# Patient Record
Sex: FEMALE | Race: WHITE | NOT HISPANIC OR LATINO | Employment: OTHER | URBAN - METROPOLITAN AREA
[De-identification: names, ages, dates, MRNs, and addresses within clinical notes are randomized per-mention and may not be internally consistent; named-entity substitution may affect disease eponyms.]

---

## 2023-09-21 ENCOUNTER — NURSING HOME VISIT (OUTPATIENT)
Dept: POST ACUTE CARE | Facility: EXTERNAL LOCATION | Age: 78
End: 2023-09-21
Payer: MEDICARE

## 2023-09-21 DIAGNOSIS — F01.511 VASCULAR DEMENTIA WITH AGITATION, UNSPECIFIED DEMENTIA SEVERITY (MULTI): ICD-10-CM

## 2023-09-21 DIAGNOSIS — G47.00 INSOMNIA, UNSPECIFIED TYPE: ICD-10-CM

## 2023-09-21 PROCEDURE — 99305 1ST NF CARE MODERATE MDM 35: CPT | Performed by: FAMILY MEDICINE

## 2023-09-21 NOTE — LETTER
Patient: Ashley Lopez  : 1945    Encounter Date: 2023    Ashley Lopez is a 77 y.o. female with Chief Complaint of SNF ([facility]) H&P    Resident seen 23 -- LIZBETH    CC: SNF ([facility]) H&P    : 1945  SNF H&P done 23 (EPIC)  Allergy: Hydroxyzine, Bactrim  DNR-CCA    S: 78 yo woman with seizure disorder, HTN, HLD, MDD, Hx TIA/CVA with progressive vascular dementia, cared for  by  present at bedside and provides history. Admitted to SNF rehab after hospitalized for fall, CHI, right brow lac.    Non Smoker  Former Psychologist    O: VSS AFEB 104# Awake, alert, pleasantly confused. NAD. Chest cta. heart rrr. Ext no c/c/e. Right brow lac healing. Resolving right periorbital ecchymosis.    A/P:  # Weakness/falls: SNF PT/OT. Goal to return home under  supervision by family. Lovenox until ambulatory.  # Anxiety: buspirone and ativan prn.  # OAB: change oxybutynin to tolterodine  # Insomnia: Trazodone  # Vascular dementia with agitation: off Seroquel per [id number]. Tolerating VPA.  # HLD/Hx CVA: statin    No past surgical history on file.   Social History     Socioeconomic History   • Marital status:      Spouse name: Not on file   • Number of children: Not on file   • Years of education: Not on file   • Highest education level: Not on file   Occupational History   • Not on file   Tobacco Use   • Smoking status: Not on file   • Smokeless tobacco: Not on file   Substance and Sexual Activity   • Alcohol use: Not on file   • Drug use: Not on file   • Sexual activity: Not on file   Other Topics Concern   • Not on file   Social History Narrative   • Not on file     Social Determinants of Health     Financial Resource Strain: Not on file   Food Insecurity: Not on file   Transportation Needs: Not on file   Physical Activity: Not on file   Stress: Not on file   Social Connections: Not on file   Intimate Partner Violence: Not on file   Housing Stability: Not on file     No past medical  history on file.   No family history on file.     Review of Systems   Constitutional:  Negative for chills, fatigue and fever.   HENT:  Negative for rhinorrhea and sore throat.    Eyes:  Negative for pain and redness.   Respiratory:  Negative for cough and shortness of breath.    Cardiovascular:  Negative for chest pain and palpitations.   Gastrointestinal:  Negative for abdominal pain and blood in stool.   Endocrine: Negative for polydipsia and polyuria.   Genitourinary:  Negative for dysuria and hematuria.   Musculoskeletal:  Negative for back pain and neck stiffness.   Skin:  Positive for wound. Negative for rash.   Allergic/Immunologic: Negative for environmental allergies and food allergies.   Neurological:  Positive for weakness. Negative for headaches.   Hematological:  Negative for adenopathy. Does not bruise/bleed easily.   Psychiatric/Behavioral:  Positive for confusion. Negative for hallucinations and suicidal ideas.       There were no vitals taken for this visit.  Physical Exam  Vitals reviewed.   Constitutional:       General: She is not in acute distress.     Appearance: She is not ill-appearing.   HENT:      Head: Normocephalic and atraumatic.      Right Ear: Tympanic membrane normal.      Left Ear: Tympanic membrane normal.      Nose: No congestion or rhinorrhea.      Mouth/Throat:      Pharynx: No oropharyngeal exudate or posterior oropharyngeal erythema.   Eyes:      Extraocular Movements: Extraocular movements intact.      Conjunctiva/sclera: Conjunctivae normal.      Pupils: Pupils are equal, round, and reactive to light.   Cardiovascular:      Rate and Rhythm: Normal rate and regular rhythm.      Heart sounds: No murmur heard.     No friction rub. No gallop.   Pulmonary:      Effort: Pulmonary effort is normal.      Breath sounds: Normal breath sounds. No wheezing, rhonchi or rales.   Abdominal:      General: There is no distension.      Palpations: Abdomen is soft.      Tenderness: There is no  "abdominal tenderness. There is no guarding or rebound.   Musculoskeletal:         General: No swelling or deformity.      Cervical back: Normal range of motion and neck supple.      Right lower leg: No edema.      Left lower leg: No edema.   Skin:     Capillary Refill: Capillary refill takes less than 2 seconds.      Coloration: Skin is not jaundiced.      Findings: Lesion present. No rash.   Neurological:      General: No focal deficit present.      Mental Status: She is alert.      Motor: Weakness present.   Psychiatric:         Mood and Affect: Mood normal.       Lab Results   Component Value Date    WBC 6.1 09/13/2023    HGB 11.3 (L) 09/13/2023    HCT 33.9 (L) 09/13/2023    MCV 93 09/13/2023     09/13/2023     No results found for: \"CHOL\"  No results found for: \"HDL\"  No results found for: \"LDLCALC\"  No results found for: \"TRIG\"  No components found for: \"CHOLHDL\"  Lab Results   Component Value Date    HGBA1C 5.4 09/14/2023       Assessment/Plan  Problem List Items Addressed This Visit       Insomnia     Other Visit Diagnoses       Vascular dementia with agitation, unspecified dementia severity (CMS/HCC)                  Electronically Signed By: Wili Gomez MD   9/26/23  5:51 PM  "

## 2023-09-25 ENCOUNTER — NURSING HOME VISIT (OUTPATIENT)
Dept: POST ACUTE CARE | Facility: EXTERNAL LOCATION | Age: 78
End: 2023-09-25
Payer: MEDICARE

## 2023-09-25 DIAGNOSIS — G47.00 INSOMNIA, UNSPECIFIED TYPE: ICD-10-CM

## 2023-09-25 DIAGNOSIS — F01.511 VASCULAR DEMENTIA WITH AGITATION, UNSPECIFIED DEMENTIA SEVERITY (MULTI): ICD-10-CM

## 2023-09-25 PROCEDURE — 99308 SBSQ NF CARE LOW MDM 20: CPT | Performed by: FAMILY MEDICINE

## 2023-09-25 NOTE — LETTER
Patient: Ashley Lopez  : 1945    Encounter Date: 2023    Resident seen 23 -- MP    CC: SNF ([facility]) Recheck    : 1945  SNF H&P done 23 (EPIC)  Allergy: Hydroxyzine, Bactrim  DNR-CCA    S: 78 yo woman with seizure disorder, HTN, HLD, MDD, Hx TIA/CVA with progressive vascular dementia, falls, CHI, right brow lac.    O: VSS AFEB 104# (23) Awake, alert, pleasantly confused. NAD. Chest cta. heart rrr. Ext no c/c/e. Right brow lac healing. Resolving right periorbital ecchymosis.    A/P:  # Weakness/falls: SNF PT/OT. Goal to return home under 24/7 supervision by family. Lovenox until ambulatory.  # Anxiety: buspirone and ativan prn.  # OAB: change oxybutynin to tolterodine  # Insomnia: Trazodone  # Vascular dementia with agitation: off Seroquel per [id number]. Tolerating VPA.  # HLD/Hx CVA: statin      Electronically Signed By: Wili Gomez MD   23  5:52 PM

## 2023-09-26 PROBLEM — G47.00 INSOMNIA: Status: ACTIVE | Noted: 2023-09-26

## 2023-09-26 PROBLEM — F01.511: Status: ACTIVE | Noted: 2023-09-26

## 2023-09-26 ASSESSMENT — ENCOUNTER SYMPTOMS
WOUND: 1
HEADACHES: 0
EYE PAIN: 0
BLOOD IN STOOL: 0
COUGH: 0
CONFUSION: 1
HALLUCINATIONS: 0
RHINORRHEA: 0
CHILLS: 0
HEMATURIA: 0
BRUISES/BLEEDS EASILY: 0
BACK PAIN: 0
ADENOPATHY: 0
FEVER: 0
ABDOMINAL PAIN: 0
POLYDIPSIA: 0
PALPITATIONS: 0
WEAKNESS: 1
SHORTNESS OF BREATH: 0
NECK STIFFNESS: 0
DYSURIA: 0
FATIGUE: 0
SORE THROAT: 0
EYE REDNESS: 0

## 2023-09-26 NOTE — PROGRESS NOTES
Resident seen 23 -- MP    CC: SNF ([facility]) Recheck    : 1945  SNF H&P done 23 (EPIC)  Allergy: Hydroxyzine, Bactrim  DNR-CCA    S: 78 yo woman with seizure disorder, HTN, HLD, MDD, Hx TIA/CVA with progressive vascular dementia, falls, CHI, right brow lac.    O: VSS AFEB 104# (23) Awake, alert, pleasantly confused. NAD. Chest cta. heart rrr. Ext no c/c/e. Right brow lac healing. Resolving right periorbital ecchymosis.    A/P:  # Weakness/falls: SNF PT/OT. Goal to return home under 24/7 supervision by family. Lovenox until ambulatory.  # Anxiety: buspirone and ativan prn.  # OAB: change oxybutynin to tolterodine  # Insomnia: Trazodone  # Vascular dementia with agitation: off Seroquel per [id number]. Tolerating VPA.  # HLD/Hx CVA: statin

## 2023-09-26 NOTE — PROGRESS NOTES
Ashley Lopez is a 77 y.o. female with Chief Complaint of SNF ([facility]) H&P    Resident seen 23 -- LIZBETH    CC: SNF ([facility]) H&P    : 1945  SNF H&P done 23 (EPIC)  Allergy: Hydroxyzine, Bactrim  DNR-CCA    S: 78 yo woman with seizure disorder, HTN, HLD, MDD, Hx TIA/CVA with progressive vascular dementia, cared for  by  present at bedside and provides history. Admitted to SNF rehab after hospitalized for fall, CHI, right brow lac.    Non Smoker  Former Psychologist    O: VSS AFEB 104# Awake, alert, pleasantly confused. NAD. Chest cta. heart rrr. Ext no c/c/e. Right brow lac healing. Resolving right periorbital ecchymosis.    A/P:  # Weakness/falls: SNF PT/OT. Goal to return home under  supervision by family. Lovenox until ambulatory.  # Anxiety: buspirone and ativan prn.  # OAB: change oxybutynin to tolterodine  # Insomnia: Trazodone  # Vascular dementia with agitation: off Seroquel per [id number]. Tolerating VPA.  # HLD/Hx CVA: statin    No past surgical history on file.   Social History     Socioeconomic History    Marital status:      Spouse name: Not on file    Number of children: Not on file    Years of education: Not on file    Highest education level: Not on file   Occupational History    Not on file   Tobacco Use    Smoking status: Not on file    Smokeless tobacco: Not on file   Substance and Sexual Activity    Alcohol use: Not on file    Drug use: Not on file    Sexual activity: Not on file   Other Topics Concern    Not on file   Social History Narrative    Not on file     Social Determinants of Health     Financial Resource Strain: Not on file   Food Insecurity: Not on file   Transportation Needs: Not on file   Physical Activity: Not on file   Stress: Not on file   Social Connections: Not on file   Intimate Partner Violence: Not on file   Housing Stability: Not on file     No past medical history on file.   No family history on file.     Review of Systems   Constitutional:   Negative for chills, fatigue and fever.   HENT:  Negative for rhinorrhea and sore throat.    Eyes:  Negative for pain and redness.   Respiratory:  Negative for cough and shortness of breath.    Cardiovascular:  Negative for chest pain and palpitations.   Gastrointestinal:  Negative for abdominal pain and blood in stool.   Endocrine: Negative for polydipsia and polyuria.   Genitourinary:  Negative for dysuria and hematuria.   Musculoskeletal:  Negative for back pain and neck stiffness.   Skin:  Positive for wound. Negative for rash.   Allergic/Immunologic: Negative for environmental allergies and food allergies.   Neurological:  Positive for weakness. Negative for headaches.   Hematological:  Negative for adenopathy. Does not bruise/bleed easily.   Psychiatric/Behavioral:  Positive for confusion. Negative for hallucinations and suicidal ideas.       There were no vitals taken for this visit.  Physical Exam  Vitals reviewed.   Constitutional:       General: She is not in acute distress.     Appearance: She is not ill-appearing.   HENT:      Head: Normocephalic and atraumatic.      Right Ear: Tympanic membrane normal.      Left Ear: Tympanic membrane normal.      Nose: No congestion or rhinorrhea.      Mouth/Throat:      Pharynx: No oropharyngeal exudate or posterior oropharyngeal erythema.   Eyes:      Extraocular Movements: Extraocular movements intact.      Conjunctiva/sclera: Conjunctivae normal.      Pupils: Pupils are equal, round, and reactive to light.   Cardiovascular:      Rate and Rhythm: Normal rate and regular rhythm.      Heart sounds: No murmur heard.     No friction rub. No gallop.   Pulmonary:      Effort: Pulmonary effort is normal.      Breath sounds: Normal breath sounds. No wheezing, rhonchi or rales.   Abdominal:      General: There is no distension.      Palpations: Abdomen is soft.      Tenderness: There is no abdominal tenderness. There is no guarding or rebound.   Musculoskeletal:          "General: No swelling or deformity.      Cervical back: Normal range of motion and neck supple.      Right lower leg: No edema.      Left lower leg: No edema.   Skin:     Capillary Refill: Capillary refill takes less than 2 seconds.      Coloration: Skin is not jaundiced.      Findings: Lesion present. No rash.   Neurological:      General: No focal deficit present.      Mental Status: She is alert.      Motor: Weakness present.   Psychiatric:         Mood and Affect: Mood normal.       Lab Results   Component Value Date    WBC 6.1 09/13/2023    HGB 11.3 (L) 09/13/2023    HCT 33.9 (L) 09/13/2023    MCV 93 09/13/2023     09/13/2023     No results found for: \"CHOL\"  No results found for: \"HDL\"  No results found for: \"LDLCALC\"  No results found for: \"TRIG\"  No components found for: \"CHOLHDL\"  Lab Results   Component Value Date    HGBA1C 5.4 09/14/2023       Assessment/Plan   Problem List Items Addressed This Visit       Insomnia     Other Visit Diagnoses       Vascular dementia with agitation, unspecified dementia severity (CMS/HCC)                "

## 2023-09-28 ENCOUNTER — NURSING HOME VISIT (OUTPATIENT)
Dept: POST ACUTE CARE | Facility: EXTERNAL LOCATION | Age: 78
End: 2023-09-28
Payer: MEDICARE

## 2023-09-28 DIAGNOSIS — N32.81 OAB (OVERACTIVE BLADDER): ICD-10-CM

## 2023-09-28 DIAGNOSIS — S06.5XAA SDH (SUBDURAL HEMATOMA) (MULTI): Primary | ICD-10-CM

## 2023-09-28 DIAGNOSIS — F32.A DEPRESSION, UNSPECIFIED DEPRESSION TYPE: ICD-10-CM

## 2023-09-28 DIAGNOSIS — I10 ESSENTIAL HYPERTENSION: ICD-10-CM

## 2023-09-28 DIAGNOSIS — K59.00 CONSTIPATION, UNSPECIFIED CONSTIPATION TYPE: ICD-10-CM

## 2023-09-28 DIAGNOSIS — E78.5 DYSLIPIDEMIA: ICD-10-CM

## 2023-09-28 PROCEDURE — 99310 SBSQ NF CARE HIGH MDM 45: CPT | Performed by: NURSE PRACTITIONER

## 2023-09-29 ENCOUNTER — HOSPITAL ENCOUNTER (INPATIENT)
Dept: DATA CONVERSION | Facility: HOSPITAL | Age: 78
LOS: 4 days | Discharge: NURSING, REHAB OR LONG-TERM CARE | DRG: 064 | End: 2023-10-04
Attending: EMERGENCY MEDICINE | Admitting: STUDENT IN AN ORGANIZED HEALTH CARE EDUCATION/TRAINING PROGRAM
Payer: MEDICARE

## 2023-09-29 DIAGNOSIS — F01.B11 MODERATE VASCULAR DEMENTIA WITH AGITATION (MULTI): ICD-10-CM

## 2023-09-29 DIAGNOSIS — I60.9 SUBARACHNOID BLEED (MULTI): Primary | ICD-10-CM

## 2023-09-29 DIAGNOSIS — N30.00 ACUTE CYSTITIS WITHOUT HEMATURIA: ICD-10-CM

## 2023-09-29 DIAGNOSIS — G47.00 INSOMNIA, UNSPECIFIED TYPE: ICD-10-CM

## 2023-09-29 DIAGNOSIS — I63.9 CEREBRAL INFARCTION, UNSPECIFIED (MULTI): ICD-10-CM

## 2023-09-29 LAB
ABO GROUP (TYPE) IN BLOOD: NORMAL
ACTIVATED PARTIAL THROMBOPLASTIN TIME IN PPP BY COAGULATION ASSAY: 33 SEC (ref 27–38)
ALANINE AMINOTRANSFERASE (SGPT) (U/L) IN SER/PLAS: 27 U/L (ref 7–45)
ALBUMIN (G/DL) IN SER/PLAS: 4.2 G/DL (ref 3.4–5)
ALKALINE PHOSPHATASE (U/L) IN SER/PLAS: 103 U/L (ref 33–136)
ANION GAP IN SER/PLAS: 14 MMOL/L (ref 10–20)
ANTIBODY SCREEN: NORMAL
ASPARTATE AMINOTRANSFERASE (SGOT) (U/L) IN SER/PLAS: 24 U/L (ref 9–39)
ATRIAL RATE: 82 BPM
BASOPHILS (10*3/UL) IN BLOOD BY AUTOMATED COUNT: 0.04 X10E9/L (ref 0–0.1)
BASOPHILS/100 LEUKOCYTES IN BLOOD BY AUTOMATED COUNT: 0.7 % (ref 0–2)
BILIRUBIN TOTAL (MG/DL) IN SER/PLAS: 0.4 MG/DL (ref 0–1.2)
CALCIUM (MG/DL) IN SER/PLAS: 9.3 MG/DL (ref 8.6–10.6)
CARBON DIOXIDE, TOTAL (MMOL/L) IN SER/PLAS: 32 MMOL/L (ref 21–32)
CHLORIDE (MMOL/L) IN SER/PLAS: 100 MMOL/L (ref 98–107)
CREATININE (MG/DL) IN SER/PLAS: 0.51 MG/DL (ref 0.5–1.05)
EOSINOPHILS (10*3/UL) IN BLOOD BY AUTOMATED COUNT: 0.1 X10E9/L (ref 0–0.4)
EOSINOPHILS/100 LEUKOCYTES IN BLOOD BY AUTOMATED COUNT: 1.7 % (ref 0–6)
ERYTHROCYTE DISTRIBUTION WIDTH (RATIO) BY AUTOMATED COUNT: 14.3 % (ref 11.5–14.5)
ERYTHROCYTE MEAN CORPUSCULAR HEMOGLOBIN CONCENTRATION (G/DL) BY AUTOMATED: 33.3 G/DL (ref 32–36)
ERYTHROCYTE MEAN CORPUSCULAR VOLUME (FL) BY AUTOMATED COUNT: 91 FL (ref 80–100)
ERYTHROCYTES (10*6/UL) IN BLOOD BY AUTOMATED COUNT: 4.2 X10E12/L (ref 4–5.2)
GFR FEMALE: >90 ML/MIN/1.73M2
GLUCOSE (MG/DL) IN SER/PLAS: 90 MG/DL (ref 74–99)
HEMATOCRIT (%) IN BLOOD BY AUTOMATED COUNT: 38.4 % (ref 36–46)
HEMOGLOBIN (G/DL) IN BLOOD: 12.8 G/DL (ref 12–16)
IMMATURE GRANULOCYTES/100 LEUKOCYTES IN BLOOD BY AUTOMATED COUNT: 0.5 % (ref 0–0.9)
INR IN PPP BY COAGULATION ASSAY: 1 (ref 0.9–1.1)
LEUKOCYTES (10*3/UL) IN BLOOD BY AUTOMATED COUNT: 5.9 X10E9/L (ref 4.4–11.3)
LYMPHOCYTES (10*3/UL) IN BLOOD BY AUTOMATED COUNT: 1.39 X10E9/L (ref 0.8–3)
LYMPHOCYTES/100 LEUKOCYTES IN BLOOD BY AUTOMATED COUNT: 23.6 % (ref 13–44)
MONOCYTES (10*3/UL) IN BLOOD BY AUTOMATED COUNT: 0.62 X10E9/L (ref 0.05–0.8)
MONOCYTES/100 LEUKOCYTES IN BLOOD BY AUTOMATED COUNT: 10.5 % (ref 2–10)
NEUTROPHILS (10*3/UL) IN BLOOD BY AUTOMATED COUNT: 3.72 X10E9/L (ref 1.6–5.5)
NEUTROPHILS/100 LEUKOCYTES IN BLOOD BY AUTOMATED COUNT: 63 % (ref 40–80)
NRBC (PER 100 WBCS) BY AUTOMATED COUNT: 0 /100 WBC (ref 0–0)
P AXIS: 70 DEGREES
P OFFSET: 181 MS
P ONSET: 157 MS
PATIENT TEMPERATURE: 37 DEGREES C
PLATELETS (10*3/UL) IN BLOOD AUTOMATED COUNT: 569 X10E9/L (ref 150–450)
POCT ANION GAP, VENOUS: 6 MMOL/L (ref 10–25)
POCT BASE EXCESS, VENOUS: 10 MMOL/L (ref -2–3)
POCT CHLORIDE, VENOUS: 99 MMOL/L (ref 98–107)
POCT GLUCOSE, VENOUS: 97 MG/DL (ref 74–99)
POCT HCO3, VENOUS: 36.8 MMOL/L (ref 22–26)
POCT HEMATOCRIT, VENOUS: 41 % (ref 36–46)
POCT HEMOGLOBIN, VENOUS: 13.5 G/DL (ref 12–16)
POCT IONIZED CALCIUM, VENOUS: 1.18 MMOL/L (ref 1.1–1.33)
POCT LACTATE, VENOUS: 1.5 MMOL/L (ref 0.4–2)
POCT OXY HEMOGLOBIN, VENOUS: 20 % (ref 45–75)
POCT PCO2, VENOUS: 58 MMHG (ref 41–51)
POCT PH, VENOUS: 7.41 (ref 7.33–7.43)
POCT PO2, VENOUS: 20 MMHG (ref 35–45)
POCT POTASSIUM, VENOUS: 3.4 MMOL/L (ref 3.5–5.3)
POCT SO2, VENOUS: 20 % (ref 45–75)
POCT SODIUM, VENOUS: 138 MMOL/L (ref 136–145)
POTASSIUM (MMOL/L) IN SER/PLAS: 3.3 MMOL/L (ref 3.5–5.3)
PR INTERVAL: 122 MS
PROTEIN TOTAL: 6.9 G/DL (ref 6.4–8.2)
PROTHROMBIN TIME (PT) IN PPP BY COAGULATION ASSAY: 11 SEC (ref 9.8–12.8)
Q ONSET: 218 MS
QRS COUNT: 14 BEATS
QRS DURATION: 76 MS
QT INTERVAL: 400 MS
QTC CALCULATION(BAZETT): 467 MS
QTC FREDERICIA: 444 MS
R AXIS: 54 DEGREES
RH FACTOR: NORMAL
SARS-COV-2 RESULT: NOT DETECTED
SODIUM (MMOL/L) IN SER/PLAS: 143 MMOL/L (ref 136–145)
T AXIS: 52 DEGREES
T OFFSET: 418 MS
UREA NITROGEN (MG/DL) IN SER/PLAS: 17 MG/DL (ref 6–23)
VALPROIC ACID (UG/ML) IN SER/PLAS: 27 UG/ML (ref 50–100)
VENTRICULAR RATE: 82 BPM

## 2023-09-29 PROCEDURE — 85610 PROTHROMBIN TIME: CPT

## 2023-09-29 PROCEDURE — 85730 THROMBOPLASTIN TIME PARTIAL: CPT

## 2023-09-29 PROCEDURE — 84132 ASSAY OF SERUM POTASSIUM: CPT | Mod: 91

## 2023-09-29 PROCEDURE — 99285 EMERGENCY DEPT VISIT HI MDM: CPT

## 2023-09-29 PROCEDURE — 86900 BLOOD TYPING SEROLOGIC ABO: CPT

## 2023-09-29 PROCEDURE — 96375 TX/PRO/DX INJ NEW DRUG ADDON: CPT

## 2023-09-29 PROCEDURE — 82805 BLOOD GASES W/O2 SATURATION: CPT

## 2023-09-29 PROCEDURE — 96374 THER/PROPH/DIAG INJ IV PUSH: CPT

## 2023-09-29 PROCEDURE — 86850 RBC ANTIBODY SCREEN: CPT

## 2023-09-29 PROCEDURE — 9990 CHARGE CONVERSION: Mod: MA

## 2023-09-29 PROCEDURE — 80164 ASSAY DIPROPYLACETIC ACD TOT: CPT

## 2023-09-29 PROCEDURE — 85018 HEMOGLOBIN: CPT | Mod: 91

## 2023-09-29 PROCEDURE — 93005 ELECTROCARDIOGRAM TRACING: CPT

## 2023-09-29 PROCEDURE — 87635 SARS-COV-2 COVID-19 AMP PRB: CPT

## 2023-09-29 PROCEDURE — 83605 ASSAY OF LACTIC ACID: CPT

## 2023-09-29 PROCEDURE — 84295 ASSAY OF SERUM SODIUM: CPT | Mod: 91

## 2023-09-29 PROCEDURE — 82947 ASSAY GLUCOSE BLOOD QUANT: CPT | Mod: 91

## 2023-09-29 PROCEDURE — 82330 ASSAY OF CALCIUM: CPT

## 2023-09-29 PROCEDURE — 82435 ASSAY OF BLOOD CHLORIDE: CPT | Mod: 91

## 2023-09-29 PROCEDURE — 80053 COMPREHEN METABOLIC PANEL: CPT

## 2023-09-29 PROCEDURE — 95812 EEG 41-60 MINUTES: CPT

## 2023-09-29 PROCEDURE — 70450 CT HEAD/BRAIN W/O DYE: CPT | Mod: MA

## 2023-09-29 PROCEDURE — 86901 BLOOD TYPING SEROLOGIC RH(D): CPT

## 2023-09-29 PROCEDURE — 85025 COMPLETE CBC W/AUTO DIFF WBC: CPT

## 2023-09-29 RX ADMIN — LEVETIRACETAM 500 MG: 5 INJECTION INTRAVENOUS at 16:59

## 2023-09-30 PROBLEM — I61.9 INTRACEREBRAL HEMORRHAGE (MULTI): Status: ACTIVE | Noted: 2023-09-30

## 2023-09-30 PROBLEM — I10 ESSENTIAL HYPERTENSION: Status: ACTIVE | Noted: 2021-10-06

## 2023-09-30 PROBLEM — G40.909 EPILEPSY (MULTI): Status: ACTIVE | Noted: 2023-09-30

## 2023-09-30 PROBLEM — I60.9 SUBARACHNOID BLEED (MULTI): Status: ACTIVE | Noted: 2023-09-30

## 2023-09-30 PROBLEM — F03.C11: Status: ACTIVE | Noted: 2022-04-07

## 2023-09-30 PROBLEM — R45.1 AGITATION: Status: ACTIVE | Noted: 2023-06-02

## 2023-09-30 PROBLEM — R32 URINARY INCONTINENCE: Status: ACTIVE | Noted: 2020-01-14

## 2023-09-30 LAB
APPEARANCE, URINE: ABNORMAL
BILIRUBIN, URINE: NEGATIVE
BLOOD, URINE: ABNORMAL
COLOR, URINE: ABNORMAL
GLUCOSE, URINE: NEGATIVE MG/DL
KETONES, URINE: NEGATIVE MG/DL
LEUKOCYTE ESTERASE, URINE: ABNORMAL
NITRITE, URINE: POSITIVE
PH, URINE: 6
PROTEIN, URINE: ABNORMAL MG/DL
RBC #/AREA URNS AUTO: NORMAL /HPF
SPECIFIC GRAVITY, URINE: 1.02
SQUAMOUS #/AREA URNS AUTO: NORMAL /HPF
UROBILINOGEN, URINE: 2 MG/DL
WBC #/AREA URNS AUTO: NORMAL /HPF

## 2023-09-30 PROCEDURE — 9990 CHARGE CONVERSION: Mod: MA

## 2023-09-30 PROCEDURE — 2500000004 HC RX 250 GENERAL PHARMACY W/ HCPCS (ALT 636 FOR OP/ED)

## 2023-09-30 PROCEDURE — 92610 EVALUATE SWALLOWING FUNCTION: CPT | Mod: GN | Performed by: SPEECH-LANGUAGE PATHOLOGIST

## 2023-09-30 PROCEDURE — 2500000001 HC RX 250 WO HCPCS SELF ADMINISTERED DRUGS (ALT 637 FOR MEDICARE OP)

## 2023-09-30 PROCEDURE — 86850 RBC ANTIBODY SCREEN: CPT

## 2023-09-30 PROCEDURE — 99232 SBSQ HOSP IP/OBS MODERATE 35: CPT | Performed by: STUDENT IN AN ORGANIZED HEALTH CARE EDUCATION/TRAINING PROGRAM

## 2023-09-30 PROCEDURE — 86900 BLOOD TYPING SEROLOGIC ABO: CPT

## 2023-09-30 PROCEDURE — 81001 URINALYSIS AUTO W/SCOPE: CPT | Performed by: STUDENT IN AN ORGANIZED HEALTH CARE EDUCATION/TRAINING PROGRAM

## 2023-09-30 PROCEDURE — 87186 SC STD MICRODIL/AGAR DIL: CPT | Performed by: STUDENT IN AN ORGANIZED HEALTH CARE EDUCATION/TRAINING PROGRAM

## 2023-09-30 PROCEDURE — 2500000004 HC RX 250 GENERAL PHARMACY W/ HCPCS (ALT 636 FOR OP/ED): Performed by: EMERGENCY MEDICINE

## 2023-09-30 PROCEDURE — 70450 CT HEAD/BRAIN W/O DYE: CPT | Mod: MA

## 2023-09-30 PROCEDURE — 1100000001 HC PRIVATE ROOM DAILY

## 2023-09-30 PROCEDURE — 86901 BLOOD TYPING SEROLOGIC RH(D): CPT

## 2023-09-30 RX ORDER — LORAZEPAM 0.5 MG/1
0.5 TABLET ORAL 2 TIMES DAILY PRN
Status: ON HOLD | COMMUNITY
End: 2023-10-02 | Stop reason: SINTOL

## 2023-09-30 RX ORDER — POLYETHYLENE GLYCOL 3350 17 G/17G
17 POWDER, FOR SOLUTION ORAL DAILY PRN
Status: DISCONTINUED | OUTPATIENT
Start: 2023-09-30 | End: 2023-10-04 | Stop reason: HOSPADM

## 2023-09-30 RX ORDER — DIVALPROEX SODIUM 125 MG/1
250 CAPSULE, COATED PELLETS ORAL EVERY 8 HOURS SCHEDULED
Status: DISCONTINUED | OUTPATIENT
Start: 2023-09-30 | End: 2023-10-04 | Stop reason: HOSPADM

## 2023-09-30 RX ORDER — DEXTROMETHORPHAN HYDROBROMIDE, GUAIFENESIN 5; 100 MG/5ML; MG/5ML
975 LIQUID ORAL EVERY 8 HOURS PRN
COMMUNITY

## 2023-09-30 RX ORDER — AMLODIPINE BESYLATE 5 MG/1
5 TABLET ORAL DAILY
Status: DISCONTINUED | OUTPATIENT
Start: 2023-09-30 | End: 2023-10-04 | Stop reason: HOSPADM

## 2023-09-30 RX ORDER — MELATONIN 3 MG
3 CAPSULE ORAL NIGHTLY PRN
COMMUNITY
End: 2023-10-04 | Stop reason: HOSPADM

## 2023-09-30 RX ORDER — BUSPIRONE HYDROCHLORIDE 5 MG/1
5 TABLET ORAL 3 TIMES DAILY
COMMUNITY
End: 2023-11-29 | Stop reason: SDUPTHER

## 2023-09-30 RX ORDER — ATORVASTATIN CALCIUM 20 MG/1
20 TABLET, FILM COATED ORAL DAILY
Status: DISCONTINUED | OUTPATIENT
Start: 2023-09-30 | End: 2023-10-04 | Stop reason: HOSPADM

## 2023-09-30 RX ORDER — ACETAMINOPHEN 160 MG/5ML
650 SOLUTION ORAL EVERY 4 HOURS PRN
Status: DISCONTINUED | OUTPATIENT
Start: 2023-09-30 | End: 2023-10-04 | Stop reason: HOSPADM

## 2023-09-30 RX ORDER — TALC
3 POWDER (GRAM) TOPICAL NIGHTLY
Status: DISCONTINUED | OUTPATIENT
Start: 2023-09-30 | End: 2023-10-04 | Stop reason: HOSPADM

## 2023-09-30 RX ORDER — FLUOXETINE HYDROCHLORIDE 20 MG/1
20 CAPSULE ORAL DAILY
Status: DISCONTINUED | OUTPATIENT
Start: 2023-09-30 | End: 2023-10-04 | Stop reason: HOSPADM

## 2023-09-30 RX ORDER — CEFTRIAXONE 1 G/50ML
INJECTION, SOLUTION INTRAVENOUS
Status: DISPENSED
Start: 2023-09-30 | End: 2023-09-30

## 2023-09-30 RX ORDER — AMOXICILLIN 250 MG
2 CAPSULE ORAL 2 TIMES DAILY
COMMUNITY

## 2023-09-30 RX ORDER — LEVETIRACETAM 5 MG/ML
500 INJECTION INTRAVASCULAR EVERY 12 HOURS
Status: DISCONTINUED | OUTPATIENT
Start: 2023-09-30 | End: 2023-09-30

## 2023-09-30 RX ORDER — AMLODIPINE BESYLATE 5 MG/1
5 TABLET ORAL DAILY
COMMUNITY

## 2023-09-30 RX ORDER — FLUOXETINE HYDROCHLORIDE 20 MG/1
20 CAPSULE ORAL DAILY
COMMUNITY

## 2023-09-30 RX ORDER — ACETAMINOPHEN 650 MG/1
650 SUPPOSITORY RECTAL EVERY 4 HOURS PRN
Status: DISCONTINUED | OUTPATIENT
Start: 2023-09-30 | End: 2023-10-04 | Stop reason: HOSPADM

## 2023-09-30 RX ORDER — AMOXICILLIN 250 MG
2 CAPSULE ORAL 2 TIMES DAILY
Status: CANCELLED | OUTPATIENT
Start: 2023-09-30

## 2023-09-30 RX ORDER — AMOXICILLIN 250 MG
2 CAPSULE ORAL 2 TIMES DAILY
Status: DISCONTINUED | OUTPATIENT
Start: 2023-09-30 | End: 2023-10-04 | Stop reason: HOSPADM

## 2023-09-30 RX ORDER — VALPROIC ACID 250 MG/1
250 CAPSULE, LIQUID FILLED ORAL 3 TIMES DAILY
COMMUNITY

## 2023-09-30 RX ORDER — ENOXAPARIN SODIUM 100 MG/ML
30 INJECTION SUBCUTANEOUS 2 TIMES DAILY
COMMUNITY
End: 2023-09-30 | Stop reason: SINTOL

## 2023-09-30 RX ORDER — QUETIAPINE FUMARATE 25 MG/1
25 TABLET, FILM COATED ORAL NIGHTLY
COMMUNITY
End: 2023-11-29 | Stop reason: SDUPTHER

## 2023-09-30 RX ORDER — POLYETHYLENE GLYCOL 3350 17 G/17G
17 POWDER, FOR SOLUTION ORAL DAILY PRN
COMMUNITY

## 2023-09-30 RX ORDER — VALPROIC ACID 250 MG/1
250 CAPSULE, LIQUID FILLED ORAL 3 TIMES DAILY
Status: DISCONTINUED | OUTPATIENT
Start: 2023-09-30 | End: 2023-09-30

## 2023-09-30 RX ORDER — POLYETHYLENE GLYCOL 3350 17 G/17G
17 POWDER, FOR SOLUTION ORAL DAILY
Status: DISCONTINUED | OUTPATIENT
Start: 2023-09-30 | End: 2023-10-04 | Stop reason: HOSPADM

## 2023-09-30 RX ORDER — QUETIAPINE FUMARATE 25 MG/1
25 TABLET, FILM COATED ORAL NIGHTLY
Status: DISCONTINUED | OUTPATIENT
Start: 2023-09-30 | End: 2023-09-30

## 2023-09-30 RX ORDER — HALOPERIDOL 1 MG/1
1 TABLET ORAL NIGHTLY PRN
COMMUNITY
End: 2023-10-04 | Stop reason: HOSPADM

## 2023-09-30 RX ORDER — DIVALPROEX SODIUM 125 MG/1
250 CAPSULE, COATED PELLETS ORAL EVERY 12 HOURS SCHEDULED
Status: DISCONTINUED | OUTPATIENT
Start: 2023-09-30 | End: 2023-09-30

## 2023-09-30 RX ORDER — TRAZODONE HYDROCHLORIDE 50 MG/1
25 TABLET ORAL NIGHTLY
Status: ON HOLD | COMMUNITY
End: 2023-10-02 | Stop reason: SINTOL

## 2023-09-30 RX ORDER — ACETAMINOPHEN 325 MG/1
975 TABLET ORAL EVERY 8 HOURS PRN
Status: DISCONTINUED | OUTPATIENT
Start: 2023-09-30 | End: 2023-09-30

## 2023-09-30 RX ORDER — TOLTERODINE 4 MG/1
4 CAPSULE, EXTENDED RELEASE ORAL EVERY MORNING
COMMUNITY
End: 2023-09-30 | Stop reason: SINTOL

## 2023-09-30 RX ORDER — ATORVASTATIN CALCIUM 20 MG/1
20 TABLET, FILM COATED ORAL DAILY
COMMUNITY

## 2023-09-30 RX ADMIN — Medication 3 MG: at 22:28

## 2023-09-30 RX ADMIN — AMLODIPINE BESYLATE 5 MG: 5 TABLET ORAL at 11:15

## 2023-09-30 RX ADMIN — ATORVASTATIN CALCIUM 20 MG: 20 TABLET ORAL at 11:15

## 2023-09-30 RX ADMIN — SENNOSIDES AND DOCUSATE SODIUM 2 TABLET: 8.6; 5 TABLET ORAL at 22:29

## 2023-09-30 RX ADMIN — DIVALPROEX SODIUM 250 MG: 125 CAPSULE, COATED PELLETS ORAL at 14:00

## 2023-09-30 RX ADMIN — POLYETHYLENE GLYCOL 3350 17 G: 17 POWDER, FOR SOLUTION ORAL at 12:54

## 2023-09-30 RX ADMIN — SENNOSIDES AND DOCUSATE SODIUM 2 TABLET: 8.6; 5 TABLET ORAL at 11:15

## 2023-09-30 RX ADMIN — FLUOXETINE HYDROCHLORIDE 20 MG: 20 CAPSULE ORAL at 11:15

## 2023-09-30 RX ADMIN — THIAMINE HYDROCHLORIDE 500 MG: 100 INJECTION, SOLUTION INTRAMUSCULAR; INTRAVENOUS at 15:37

## 2023-09-30 ASSESSMENT — ENCOUNTER SYMPTOMS
WEAKNESS: 1
DECREASED CONCENTRATION: 1
NERVOUS/ANXIOUS: 1
CONFUSION: 1

## 2023-09-30 ASSESSMENT — COGNITIVE AND FUNCTIONAL STATUS - GENERAL
CLIMB 3 TO 5 STEPS WITH RAILING: A LOT
MOVING FROM LYING ON BACK TO SITTING ON SIDE OF FLAT BED WITH BEDRAILS: A LOT
EATING MEALS: A LOT
WALKING IN HOSPITAL ROOM: A LOT
TOILETING: A LOT
MOVING TO AND FROM BED TO CHAIR: A LOT
TURNING FROM BACK TO SIDE WHILE IN FLAT BAD: A LOT
DRESSING REGULAR LOWER BODY CLOTHING: A LOT
MOBILITY SCORE: 12
HELP NEEDED FOR BATHING: A LOT
DAILY ACTIVITIY SCORE: 12
PATIENT BASELINE BEDBOUND: NO
DRESSING REGULAR UPPER BODY CLOTHING: A LOT
STANDING UP FROM CHAIR USING ARMS: A LOT
PERSONAL GROOMING: A LOT

## 2023-09-30 ASSESSMENT — PAIN - FUNCTIONAL ASSESSMENT
PAIN_FUNCTIONAL_ASSESSMENT: WONG-BAKER FACES
PAIN_FUNCTIONAL_ASSESSMENT: PAINAD (PAIN ASSESSMENT IN ADVANCED DEMENTIA SCALE)
PAIN_FUNCTIONAL_ASSESSMENT: WONG-BAKER FACES

## 2023-09-30 ASSESSMENT — PAIN SCALES - GENERAL
PAINLEVEL_OUTOF10: 0 - NO PAIN

## 2023-09-30 ASSESSMENT — PAIN SCALES - WONG BAKER
WONGBAKER_NUMERICALRESPONSE: NO HURT
WONGBAKER_NUMERICALRESPONSE: NO HURT

## 2023-09-30 ASSESSMENT — PAIN DESCRIPTION - PROGRESSION: CLINICAL_PROGRESSION: NOT CHANGED

## 2023-09-30 ASSESSMENT — COLUMBIA-SUICIDE SEVERITY RATING SCALE - C-SSRS
1. IN THE PAST MONTH, HAVE YOU WISHED YOU WERE DEAD OR WISHED YOU COULD GO TO SLEEP AND NOT WAKE UP?: NO
6. HAVE YOU EVER DONE ANYTHING, STARTED TO DO ANYTHING, OR PREPARED TO DO ANYTHING TO END YOUR LIFE?: NO
2. HAVE YOU ACTUALLY HAD ANY THOUGHTS OF KILLING YOURSELF?: NO

## 2023-09-30 NOTE — SIGNIFICANT EVENT
Neurology Final Recommendations & Signoff    Seen with attending today on rounds. Intermittently following commands x4. Rest of exam consistent with that by call team. Read & reviewed EEG which reveals theta stupor and amplitudinal asymmetry consistent with encephalopathy and known SDH. No epileptiform activity seen.      ==  Assessment:  Ashley Lopez is a 77 y.o. female with a history of vascular dementia, secondary epilepsy, HTN, DLD, and prior cerebral infarction who is admitted to inpatient internal medicine for AMS in the setting of UTI. Neurology is consulted for AMS and to evaluate for seizure. Exam consistent with toxic-metabolic encephalopathy; EEG shows encephalopathic changes but no seizure. Presentation is most-consistent with Toxic-Metabolic Encephalopathy, for which we recommend treatment of the underlying cause.    Impression: Toxic-Metabolic Encephalopathy (TME)    Recommendations:  - discontinue EEG  - continue home VPA 250mg TID  - start thiamine 500mg IVPB TID x 3 days, followed by thiamine 100mg PO QDay for 2 weeks  - treatment of infection per IM  - if patient's condition fails to respond to the above treatment, can re-engage neurology for further workup      Thank you for this consult. Neurology will sign-off at this time. Please do not hesitate to reach out to our team by page or secure chat with any questions you may have. Patient was seen, examined, and discussed with the attending physician, who agrees w/ the assessment and plan.    Filemon Howe MD PGY-3  [facility] Inpatient Neurology Team  General Neurology Pager [phone]

## 2023-09-30 NOTE — HOSPITAL COURSE
Ashley Lopez is a 77-year-old female with a PMHx  of vascular dementia, epilepsy on valproic acid, HTN, HLD, prior CVAs (x4), presenting to [facility] after reportedly having worsening mental status (according to SNF), new SAH, and worsening of previous SDH on f/u OP CT. Of note, she was recently admitted due to SDH after a fall (9/11) and discharged to [facility] at [place]. In the ED, patient was A&Ox0 and not following command. Repeat CT showed stable SAH with no further intervention required per Neurosurgery. EEG obtained and showed findings consistent w/ encephalopathy and known SDH but w/o epileptiform activity. Valproate was continued for seizure prophylaxis, and thiamine added (100mg daily until 10/14). U/A was obtained and revealed trace LE and positive nitrites but no pyuria (15 WBCs). External urethral cath placed. Concern for UTI with culture revealing E. Coli and E. Faecalis. Patient initiated on Nitrofurantoin 100mg PO BID on 10/2 evening for a 5 day course (until 10/7). Ativan, Trazodone and Buspirone from nursing home were discontinued on arrival as polypharmacy may have contributed to AMS. On 10/2, there were concerns for continuing evening agitation due to sundowning and pt attempting to leave the bed, so seroquel 25mg PO was added PRN on evenings. Pt is back at her baseline and w/o complaints. Patient is ready for discharge to [place] for rehabilitation. Medication changes per AVS. We also recommend following-up with Neurosurgery for SAH and with PCP for UTI and general wellness.

## 2023-09-30 NOTE — PROGRESS NOTES
Speech-Language Pathology    Inpatient Speech-Language Pathology Clinical Swallow Evaluation    Patient Name: Ashley Lopez  MRN: [id number]  Today's Date: 9/30/2023   Time Calculation  Start Time: 1414  Stop Time: 1444  Time Calculation (min): 30 min         Current Problem:   Patient Active Problem List   Diagnosis    Insomnia    Vascular dementia with agitation (CMS/HCC)    Agitation    Epilepsy (CMS/HCC)    Essential hypertension    Intracerebral hemorrhage (CMS/HCC)    Urinary incontinence    Severe dementia with agitation (CMS/HCC)    Subarachnoid bleed (CMS/HCC)         Recommendations:  Solid Diet Recommendations : Regular (IDDSI Level 7)  Liquid Diet Recommendations: Thin (IDDSI Level 0)  Compensatory Swallowing Strategies: Upright 90 degrees as possible for all oral intake, Remain upright for 20-30 minutes after meals  Medication Administration Recommendations: With Liquid      Assessment: Clinical swallow evaluation completed. Pt. A&O to self only with functional oral motor musculature. Pt. tolerated ice chips and sips of water without difficulty. Consumed 3 oz of water in continuous sequential swallows without overt s/s of aspiration.  Adequate manipulation of puree and mastication of solids with full oral clearance. No evidence of dysphagia at this time. If there is a change in medical condition or increase difficulty swallowing please reconsult SLP. Discussed with RN and MD results and recommendations.         Plan:  SLP Plan: No skilled SLP  SLP Discharge Recommendations: Home with no further SLP      Subjective   Current Problem:  Ashley Lopez is a 77 y.o. female with a history of vascular dementia, secondary epilepsy, HTN, DLD, and prior cerebral infarction who is admitted to inpatient internal medicine for AMS in the setting of UTI. Neurology is consulted for AMS and to evaluate for seizure. Exam consistent with toxic-metabolic encephalopathy; EEG shows encephalopathic changes but no seizure.  Presentation is most-consistent with Toxic-Metabolic Encephalopathy, for which we recommend treatment of the underlying cause.       General Visit Information:  Reason for Referral: Concern for dyspahgia           Objective   Patient tolerated session well.          Pain:  Pain Assessment: Moore-Baker FACES  Pain Score: 0 - No pain       Oral/Motor Assessment:   Oral musculature WFL, adequate dentition in good shape      Consistencies Trialed: Thin liquids, puree and solids       Inpatient:  Education Documentation  No documentation found.  Education Comments  No comments found.    Review results with pt, however due to demential question comprehension at this time.

## 2023-09-30 NOTE — ED PROVIDER NOTES
I received Ashley Lopez in signout from Dr. Compa Mota.  Please see the previous note for all HPI, PE and MDM up to the time of signout at 0700 on 9/30/2023.    In brief Ashley Lopez is an 77 y.o. female presenting as a transfer from [facility] after she was found to have a new SAH.  Chief Complaint   Patient presents with    Other     BRAIN BLEED    Fall     Transfer from [facility] d/t fall 9/11/23. Pt was dx w/ SDH at that time but has been exhibiting severe AMS the past week. OSH repeat head CT shows SAH in L fromtal lobe abd SDH R cerebral convexity that has increased in size. Pt states it is 1976 and she is 42 y/o     At the time of signout the patient was admitted to medicine.    At approximately 0900 the patient desaturated to 88%. Nasal cannula was placed and flow was set to 2L with improvement in SpO2 to 96%.      Pt Disposition: Admitted           Halley Callejas MD  Resident  09/30/23 0911       Halley Callejas MD  Resident  09/30/23 1939
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
## 2023-10-01 LAB
ALBUMIN SERPL BCP-MCNC: 3.9 G/DL (ref 3.4–5)
ALP SERPL-CCNC: 100 U/L (ref 33–136)
ALT SERPL W P-5'-P-CCNC: 75 U/L (ref 7–45)
ANION GAP SERPL CALC-SCNC: 14 MMOL/L (ref 10–20)
AST SERPL W P-5'-P-CCNC: 47 U/L (ref 9–39)
BILIRUB SERPL-MCNC: 0.4 MG/DL (ref 0–1.2)
BUN SERPL-MCNC: 10 MG/DL (ref 6–23)
CALCIUM SERPL-MCNC: 9.4 MG/DL (ref 8.6–10.6)
CHLORIDE SERPL-SCNC: 97 MMOL/L (ref 98–107)
CO2 SERPL-SCNC: 31 MMOL/L (ref 21–32)
CREAT SERPL-MCNC: 0.49 MG/DL (ref 0.5–1.05)
ERYTHROCYTE [DISTWIDTH] IN BLOOD BY AUTOMATED COUNT: 14.7 % (ref 11.5–14.5)
GFR SERPL CREATININE-BSD FRML MDRD: >90 ML/MIN/1.73M*2
GLUCOSE SERPL-MCNC: 97 MG/DL (ref 74–99)
HCT VFR BLD AUTO: 37.8 % (ref 36–46)
HGB BLD-MCNC: 12.4 G/DL (ref 12–16)
HOLD SPECIMEN: NORMAL
MAGNESIUM SERPL-MCNC: 2.18 MG/DL (ref 1.6–2.4)
MCH RBC QN AUTO: 30.2 PG (ref 26–34)
MCHC RBC AUTO-ENTMCNC: 32.8 G/DL (ref 32–36)
MCV RBC AUTO: 92 FL (ref 80–100)
NRBC BLD-RTO: 0 /100 WBCS (ref 0–0)
PLATELET # BLD AUTO: 496 X10*3/UL (ref 150–450)
PMV BLD AUTO: 10.1 FL (ref 7.5–11.5)
POTASSIUM SERPL-SCNC: 3.9 MMOL/L (ref 3.5–5.3)
PROT SERPL-MCNC: 6.2 G/DL (ref 6.4–8.2)
RBC # BLD AUTO: 4.11 X10*6/UL (ref 4–5.2)
SODIUM SERPL-SCNC: 138 MMOL/L (ref 136–145)
WBC # BLD AUTO: 6.5 X10*3/UL (ref 4.4–11.3)

## 2023-10-01 PROCEDURE — 99232 SBSQ HOSP IP/OBS MODERATE 35: CPT | Performed by: STUDENT IN AN ORGANIZED HEALTH CARE EDUCATION/TRAINING PROGRAM

## 2023-10-01 PROCEDURE — 1100000001 HC PRIVATE ROOM DAILY

## 2023-10-01 PROCEDURE — 80053 COMPREHEN METABOLIC PANEL: CPT

## 2023-10-01 PROCEDURE — 83735 ASSAY OF MAGNESIUM: CPT

## 2023-10-01 PROCEDURE — 97162 PT EVAL MOD COMPLEX 30 MIN: CPT | Mod: GP

## 2023-10-01 PROCEDURE — 2500000001 HC RX 250 WO HCPCS SELF ADMINISTERED DRUGS (ALT 637 FOR MEDICARE OP)

## 2023-10-01 PROCEDURE — 36415 COLL VENOUS BLD VENIPUNCTURE: CPT

## 2023-10-01 PROCEDURE — 85027 COMPLETE CBC AUTOMATED: CPT

## 2023-10-01 PROCEDURE — 2500000004 HC RX 250 GENERAL PHARMACY W/ HCPCS (ALT 636 FOR OP/ED)

## 2023-10-01 RX ADMIN — SENNOSIDES AND DOCUSATE SODIUM 2 TABLET: 8.6; 5 TABLET ORAL at 08:42

## 2023-10-01 RX ADMIN — THIAMINE HYDROCHLORIDE 500 MG: 100 INJECTION, SOLUTION INTRAMUSCULAR; INTRAVENOUS at 08:55

## 2023-10-01 RX ADMIN — POLYETHYLENE GLYCOL 3350 17 G: 17 POWDER, FOR SOLUTION ORAL at 08:42

## 2023-10-01 RX ADMIN — DIVALPROEX SODIUM 250 MG: 125 CAPSULE, COATED PELLETS ORAL at 03:55

## 2023-10-01 RX ADMIN — Medication 3 MG: at 22:35

## 2023-10-01 RX ADMIN — AMLODIPINE BESYLATE 5 MG: 5 TABLET ORAL at 08:43

## 2023-10-01 RX ADMIN — ATORVASTATIN CALCIUM 20 MG: 20 TABLET ORAL at 08:42

## 2023-10-01 RX ADMIN — FLUOXETINE HYDROCHLORIDE 20 MG: 20 CAPSULE ORAL at 08:42

## 2023-10-01 RX ADMIN — DIVALPROEX SODIUM 250 MG: 125 CAPSULE, COATED PELLETS ORAL at 22:35

## 2023-10-01 RX ADMIN — SENNOSIDES AND DOCUSATE SODIUM 2 TABLET: 8.6; 5 TABLET ORAL at 22:35

## 2023-10-01 RX ADMIN — DIVALPROEX SODIUM 250 MG: 125 CAPSULE, COATED PELLETS ORAL at 13:33

## 2023-10-01 ASSESSMENT — COGNITIVE AND FUNCTIONAL STATUS - GENERAL
WALKING IN HOSPITAL ROOM: TOTAL
DAILY ACTIVITIY SCORE: 12
STANDING UP FROM CHAIR USING ARMS: A LOT
MOBILITY SCORE: 9
MOVING TO AND FROM BED TO CHAIR: TOTAL
DRESSING REGULAR UPPER BODY CLOTHING: A LOT
CLIMB 3 TO 5 STEPS WITH RAILING: TOTAL
TURNING FROM BACK TO SIDE WHILE IN FLAT BAD: A LOT
MOVING TO AND FROM BED TO CHAIR: TOTAL
EATING MEALS: A LOT
STANDING UP FROM CHAIR USING ARMS: A LOT
MOVING FROM LYING ON BACK TO SITTING ON SIDE OF FLAT BED WITH BEDRAILS: A LOT
WALKING IN HOSPITAL ROOM: TOTAL
MOVING FROM LYING ON BACK TO SITTING ON SIDE OF FLAT BED WITH BEDRAILS: A LOT
HELP NEEDED FOR BATHING: A LOT
TOILETING: A LOT
CLIMB 3 TO 5 STEPS WITH RAILING: TOTAL
DRESSING REGULAR LOWER BODY CLOTHING: A LOT
TURNING FROM BACK TO SIDE WHILE IN FLAT BAD: A LOT
MOBILITY SCORE: 9
PERSONAL GROOMING: A LOT

## 2023-10-01 ASSESSMENT — PAIN SCALES - GENERAL: PAINLEVEL_OUTOF10: 0 - NO PAIN

## 2023-10-01 ASSESSMENT — PAIN - FUNCTIONAL ASSESSMENT: PAIN_FUNCTIONAL_ASSESSMENT: 0-10

## 2023-10-01 NOTE — CARE PLAN
Problem: Balance  Goal: STG - Maintains static sitting balance without upper extremity support  Description: At EOB x10 minutes with supervision  Outcome: Progressing     Problem: Transfers  Goal: STG - Transfer from bed to chair  Description: Via stand pivot transfer with modA x1  Outcome: Progressing  Goal: STG - Patient to transfer to and from sit to supine  Description: With Gisel x1  Outcome: Progressing  Goal: STG - Patient will transfer sit to and from stand  Description: With LRAD and Gisel x1  Outcome: Progressing

## 2023-10-01 NOTE — PROGRESS NOTES
"Ashley Lopez is a 77 y.o. female on day 1 of admission presenting with Subarachnoid bleed (CMS/HCC).    Subjective   N/A       Objective     Physical Exam  EONS  OU4R  BUE localizing   BLE w/d    Last Recorded Vitals  Blood pressure (!) 135/98, pulse 80, temperature 36.7 °C (98.1 °F), resp. rate 17, height 1.676 m (5' 6\"), weight 63.5 kg (140 lb), SpO2 96 %.  Intake/Output last 3 Shifts:  No intake/output data recorded.    Relevant Results                             Assessment/Plan   Principal Problem:    Subarachnoid bleed (CMS/HCC)    77 year old of CVA on ASA, vascular dementia, epilepsy, HTN, HLD p/w multiple falls, last on 9/11, CTH with R acute SDH, rCTH with increased SDH, 9/12 CTH stable, 9/29 presenting with worsening mental status, CTH with stable resolving right subacute on chronic SDH, repeat CTH stable, spot EEG in ED negative    Recs    No further neurosurgical care is needed, patient with stable chronic SDH  We will sign off, page [phone] with any questions       I spent 30 minutes in the professional and overall care of this patient.      Derrek Nolasco MD      "

## 2023-10-01 NOTE — PROGRESS NOTES
"Ashley Lopez is a 77 y.o. female on day 1 of admission presenting with Subarachnoid bleed (CMS/HCC). PMHx is relevant for vascular dementia, epilepsy on valproic acid, HTN, prior CVAs who presents to [facility] on 9/28 for AMS with likely underlying UTI. Recent history is relevant for recent fall and admission on 9/11 for subdural hematoma, treated conservatively, and baseline confusion and disorientation.    Subjective   Per night team, BP of 220/100s was reported overnight but per resident's check, BP was repeated and found to be 150/70s. Patient was asymptomatic. Delays with pharmacy overnight, so valproate was only givern at 3am. This morning, pt is seen at bedside, reports having slept well. Is still A&Ox0, although able to answer yes vs no questions when asked directly (I.e. is you name Kelli? Ashley? Are you at home? Hospital? Etc). No changes from yesterday.       Objective     Physical Exam  Constitutional:       General: She is awake.      Comments: Awake and disoriented. Knows she is not currently \"at home\" but cannot state her name, location or time.   Cardiovascular:      Rate and Rhythm: Normal rate and regular rhythm.      Heart sounds: Normal heart sounds.   Pulmonary:      Effort: Pulmonary effort is normal.      Breath sounds: Normal breath sounds.   Abdominal:      General: Bowel sounds are normal. There is no distension.      Palpations: Abdomen is soft. There is no mass.      Tenderness: There is no abdominal tenderness.   Musculoskeletal:      Right lower leg: No edema.      Left lower leg: No edema.   Skin:     General: Skin is warm and dry.   Neurological:      Mental Status: She is disoriented.      Comments: Right sided weakness. Can wiggle her toes bilaterally but L>R and cannot squeeze her R hand. Difficulty following commands.   Psychiatric:         Cognition and Memory: Cognition is impaired. Memory is impaired.      Comments: Patient is alert and responds to only a few questions, but " "otherwise not oriented to time, place, name or situation.     Last Recorded Vitals  Blood pressure (!) 135/98, pulse 80, temperature 36.7 °C (98.1 °F), resp. rate 17, height 1.676 m (5' 6\"), weight 63.5 kg (140 lb), SpO2 96 %.  Intake/Output last 3 Shifts:  No intake/output data recorded.    Relevant Results                Scheduled medications  amLODIPine, 5 mg, oral, Daily  atorvastatin, 20 mg, oral, Daily  divalproex sprinkle, 250 mg, oral, q8h JALIL  FLUoxetine, 20 mg, oral, Daily  melatonin, 3 mg, oral, Nightly  polyethylene glycol, 17 g, oral, Daily  sennosides-docusate sodium, 2 tablet, oral, BID  thiamine, 500 mg, intravenous, Daily      Continuous medications     PRN medications  PRN medications: acetaminophen **OR** acetaminophen, polyethylene glycol    Results for orders placed or performed during the hospital encounter of 09/29/23 (from the past 24 hour(s))   CBC   Result Value Ref Range    WBC 6.5 4.4 - 11.3 x10*3/uL    nRBC 0.0 0.0 - 0.0 /100 WBCs    RBC 4.11 4.00 - 5.20 x10*6/uL    Hemoglobin 12.4 12.0 - 16.0 g/dL    Hematocrit 37.8 36.0 - 46.0 %    MCV 92 80 - 100 fL    MCH 30.2 26.0 - 34.0 pg    MCHC 32.8 32.0 - 36.0 g/dL    RDW 14.7 (H) 11.5 - 14.5 %    Platelets 496 (H) 150 - 450 x10*3/uL    MPV 10.1 7.5 - 11.5 fL   Comprehensive metabolic panel   Result Value Ref Range    Glucose 97 74 - 99 mg/dL    Sodium 138 136 - 145 mmol/L    Potassium 3.9 3.5 - 5.3 mmol/L    Chloride 97 (L) 98 - 107 mmol/L    Bicarbonate 31 21 - 32 mmol/L    Anion Gap 14 10 - 20 mmol/L    Urea Nitrogen 10 6 - 23 mg/dL    Creatinine 0.49 (L) 0.50 - 1.05 mg/dL    eGFR >90 >60 mL/min/1.73m*2    Calcium 9.4 8.6 - 10.6 mg/dL    Albumin 3.9 3.4 - 5.0 g/dL    Alkaline Phosphatase 100 33 - 136 U/L    Total Protein 6.2 (L) 6.4 - 8.2 g/dL    AST 47 (H) 9 - 39 U/L    Bilirubin, Total 0.4 0.0 - 1.2 mg/dL    ALT 75 (H) 7 - 45 U/L   Magnesium   Result Value Ref Range    Magnesium 2.18 1.60 - 2.40 mg/dL                Assessment/Plan "   Principal Problem:    Subarachnoid bleed (CMS/HCC)    Ashley Lopez is a 77-year-old female with a PMHx  of vascular dementia, epilepsy, HTN, HLD, prior CVAs (x4), presenting to [facility] due to abnormal findings on follow-up CT scan, concerning for new SAH on chronic SDH from a previous fall on 9/11/2023 with U/A findings concerning for possible UTI.     The etiology of AMS is likely multifactorial, but mainly SDH on top of her baseline dementia, although patient has a history of recurrent UTIs. Per recent history, there has been no concern for seizure.    Updates 10/1  -No neurological changes since yesterday  -Repeat U/A (now U/Ax2) unchanged: no pyuria, positive nitrite, trace LE, 1+ blood w/o RBCs  -UCx revealing >100,000 E. Bacilli, possible contamination?  -Waiting on UCx sensitivities prior to starting Abx  -Pt remains afebrile and w/o leukocytosis  -Hgb stable, 12.4  -Neurosurgery approved restarting DVT ppx  -No seizure activity clinically nor on EEG from 9/29 per Neuro       #AMS  - CT head obtained in the ED showing chronic stable SDH and SAH along L central sulcus and R frontal lobe  - Per Neuro, EEG revealed theta stupor and amplitudinal asymmetry consistent w/ encephalopathy and known SDH. No epileptiform activity seen. EEG dc'ed.  - Swallow study was done and revealed to be normal; patient now on a regular diet  [] Continue home VPA 250mg TID  [] Continue thiamine 500mg IVPB TID x3 days, followed by thiamine 100mg PO qd for 2 weeks  [] DVT ppx good to restart per Neurosurgery       #UTI  - U/A from the ED showed positive nitrites and trace LE although w/o pyuria  - Repeat U/A on 9/30 showed no changes  - Urine cx revealing >100,000 units of Enteric bacilli; maybe from cross-contamination? Will treat  - Patient afebrile and w/ no leukocytosis: WBC 6.5 (10/1)   - One dose of Ceftriaxone given in the ED, now discontinued  [] Will wait on urine cx sensitivities to come back prior to initiating  antibiotic regiment     F: PRN  E: PRN  N: regular diet  A: PIV     GI ppx: not indicated  DVT ppx: SubQ heparin (allowed per Neuro on 10/1)           Sera Duran

## 2023-10-01 NOTE — PROGRESS NOTES
"Ashley Lopez is a 77 y.o. female on day 0 of admission presenting with Subarachnoid bleed (CMS/HCC).    Subjective   Unable to contribute       Objective     Physical Exam  EONS  OU4R  BUE localizing   BLE w/d    Last Recorded Vitals  Blood pressure 163/87, pulse 92, temperature 36.3 °C (97.3 °F), resp. rate 17, height 1.676 m (5' 6\"), weight 63.5 kg (140 lb), SpO2 98 %.  Intake/Output last 3 Shifts:  No intake/output data recorded.    Relevant Results                             Assessment/Plan   Principal Problem:    Subarachnoid bleed (CMS/HCC)    77 year old of CVA on ASA, vascular dementia, epilepsy, HTN, HLD p/w multiple falls, last on 9/11, CTH with R acute SDH, rCTH with increased SDH, 9/12 CTH stable, 9/29 presenting with worsening mental status, CTH with stable resolving right subacute on chronic SDH, repeat CTH stable, spot EEG in ED negative    Recs    Recommend medicine admission for AMS work-up  Neurology recs re possible seizure etiology of waxing/waning mental status  AED per neurology  Ok to restart DVT ppx   Page [phone] with any questions       I spent 30 minutes in the professional and overall care of this patient.      Yoan Castrejon MD      "

## 2023-10-02 LAB
ALBUMIN SERPL BCP-MCNC: 3.7 G/DL (ref 3.4–5)
ANION GAP SERPL CALC-SCNC: 12 MMOL/L (ref 10–20)
BUN SERPL-MCNC: 8 MG/DL (ref 6–23)
CALCIUM SERPL-MCNC: 9.2 MG/DL (ref 8.6–10.6)
CHLORIDE SERPL-SCNC: 99 MMOL/L (ref 98–107)
CO2 SERPL-SCNC: 32 MMOL/L (ref 21–32)
CREAT SERPL-MCNC: 0.42 MG/DL (ref 0.5–1.05)
ERYTHROCYTE [DISTWIDTH] IN BLOOD BY AUTOMATED COUNT: 14.6 % (ref 11.5–14.5)
GFR SERPL CREATININE-BSD FRML MDRD: >90 ML/MIN/1.73M*2
GLUCOSE SERPL-MCNC: 96 MG/DL (ref 74–99)
HCT VFR BLD AUTO: 38.7 % (ref 36–46)
HGB BLD-MCNC: 12.6 G/DL (ref 12–16)
MAGNESIUM SERPL-MCNC: 2.24 MG/DL (ref 1.6–2.4)
MCH RBC QN AUTO: 30.7 PG (ref 26–34)
MCHC RBC AUTO-ENTMCNC: 32.6 G/DL (ref 32–36)
MCV RBC AUTO: 94 FL (ref 80–100)
NRBC BLD-RTO: 0 /100 WBCS (ref 0–0)
PHOSPHATE SERPL-MCNC: 3.7 MG/DL (ref 2.5–4.9)
PLATELET # BLD AUTO: 451 X10*3/UL (ref 150–450)
PMV BLD AUTO: 10.4 FL (ref 7.5–11.5)
POTASSIUM SERPL-SCNC: 3.9 MMOL/L (ref 3.5–5.3)
RBC # BLD AUTO: 4.1 X10*6/UL (ref 4–5.2)
SODIUM SERPL-SCNC: 139 MMOL/L (ref 136–145)
WBC # BLD AUTO: 5.8 X10*3/UL (ref 4.4–11.3)

## 2023-10-02 PROCEDURE — 1100000001 HC PRIVATE ROOM DAILY

## 2023-10-02 PROCEDURE — 83735 ASSAY OF MAGNESIUM: CPT | Performed by: STUDENT IN AN ORGANIZED HEALTH CARE EDUCATION/TRAINING PROGRAM

## 2023-10-02 PROCEDURE — 93005 ELECTROCARDIOGRAM TRACING: CPT

## 2023-10-02 PROCEDURE — 99232 SBSQ HOSP IP/OBS MODERATE 35: CPT | Performed by: INTERNAL MEDICINE

## 2023-10-02 PROCEDURE — 2500000004 HC RX 250 GENERAL PHARMACY W/ HCPCS (ALT 636 FOR OP/ED)

## 2023-10-02 PROCEDURE — 96375 TX/PRO/DX INJ NEW DRUG ADDON: CPT

## 2023-10-02 PROCEDURE — 36415 COLL VENOUS BLD VENIPUNCTURE: CPT | Performed by: STUDENT IN AN ORGANIZED HEALTH CARE EDUCATION/TRAINING PROGRAM

## 2023-10-02 PROCEDURE — 9990 CHARGE CONVERSION

## 2023-10-02 PROCEDURE — 2500000004 HC RX 250 GENERAL PHARMACY W/ HCPCS (ALT 636 FOR OP/ED): Performed by: STUDENT IN AN ORGANIZED HEALTH CARE EDUCATION/TRAINING PROGRAM

## 2023-10-02 PROCEDURE — 2500000001 HC RX 250 WO HCPCS SELF ADMINISTERED DRUGS (ALT 637 FOR MEDICARE OP)

## 2023-10-02 PROCEDURE — 96374 THER/PROPH/DIAG INJ IV PUSH: CPT

## 2023-10-02 PROCEDURE — 85027 COMPLETE CBC AUTOMATED: CPT | Performed by: STUDENT IN AN ORGANIZED HEALTH CARE EDUCATION/TRAINING PROGRAM

## 2023-10-02 PROCEDURE — 80069 RENAL FUNCTION PANEL: CPT | Performed by: STUDENT IN AN ORGANIZED HEALTH CARE EDUCATION/TRAINING PROGRAM

## 2023-10-02 RX ORDER — NITROFURANTOIN 25; 75 MG/1; MG/1
100 CAPSULE ORAL EVERY 12 HOURS SCHEDULED
Status: DISCONTINUED | OUTPATIENT
Start: 2023-10-02 | End: 2023-10-04 | Stop reason: HOSPADM

## 2023-10-02 RX ORDER — QUETIAPINE FUMARATE 25 MG/1
25 TABLET, FILM COATED ORAL NIGHTLY PRN
Status: DISCONTINUED | OUTPATIENT
Start: 2023-10-02 | End: 2023-10-04 | Stop reason: HOSPADM

## 2023-10-02 RX ORDER — MELATONIN 5 MG
1 CAPSULE ORAL NIGHTLY
Qty: 30 CAPSULE | Refills: 0
Start: 2023-10-02 | End: 2023-11-01

## 2023-10-02 RX ADMIN — AMLODIPINE BESYLATE 5 MG: 5 TABLET ORAL at 10:31

## 2023-10-02 RX ADMIN — ATORVASTATIN CALCIUM 20 MG: 20 TABLET ORAL at 10:29

## 2023-10-02 RX ADMIN — QUETIAPINE FUMARATE 25 MG: 25 TABLET ORAL at 22:28

## 2023-10-02 RX ADMIN — FLUOXETINE HYDROCHLORIDE 20 MG: 20 CAPSULE ORAL at 13:48

## 2023-10-02 RX ADMIN — DIVALPROEX SODIUM 250 MG: 125 CAPSULE, COATED PELLETS ORAL at 06:31

## 2023-10-02 RX ADMIN — NITROFURANTOIN MONOHYDRATE/MACROCRYSTALS 100 MG: 25; 75 CAPSULE ORAL at 20:05

## 2023-10-02 RX ADMIN — DIVALPROEX SODIUM 250 MG: 125 CAPSULE, COATED PELLETS ORAL at 22:37

## 2023-10-02 RX ADMIN — ACETAMINOPHEN 650 MG: 650 SOLUTION ORAL at 22:28

## 2023-10-02 RX ADMIN — THIAMINE HYDROCHLORIDE 500 MG: 100 INJECTION, SOLUTION INTRAMUSCULAR; INTRAVENOUS at 13:07

## 2023-10-02 RX ADMIN — Medication 3 MG: at 22:27

## 2023-10-02 ASSESSMENT — COGNITIVE AND FUNCTIONAL STATUS - GENERAL
STANDING UP FROM CHAIR USING ARMS: A LOT
TURNING FROM BACK TO SIDE WHILE IN FLAT BAD: A LITTLE
DRESSING REGULAR LOWER BODY CLOTHING: A LITTLE
TOILETING: A LITTLE
WALKING IN HOSPITAL ROOM: A LITTLE
HELP NEEDED FOR BATHING: A LITTLE
MOVING FROM LYING ON BACK TO SITTING ON SIDE OF FLAT BED WITH BEDRAILS: A LITTLE
DAILY ACTIVITIY SCORE: 18
CLIMB 3 TO 5 STEPS WITH RAILING: A LITTLE
MOVING TO AND FROM BED TO CHAIR: A LITTLE
MOBILITY SCORE: 17
EATING MEALS: A LITTLE
DRESSING REGULAR UPPER BODY CLOTHING: A LITTLE
PERSONAL GROOMING: A LITTLE

## 2023-10-02 ASSESSMENT — PAIN SCALES - WONG BAKER
WONGBAKER_NUMERICALRESPONSE: HURTS LITTLE MORE
WONGBAKER_NUMERICALRESPONSE: NO HURT

## 2023-10-02 ASSESSMENT — PAIN SCALES - GENERAL
PAINLEVEL_OUTOF10: 0 - NO PAIN
PAINLEVEL_OUTOF10: 3

## 2023-10-02 ASSESSMENT — PAIN - FUNCTIONAL ASSESSMENT: PAIN_FUNCTIONAL_ASSESSMENT: 0-10

## 2023-10-02 NOTE — PROGRESS NOTES
*Provider Impression*    Patient is a 77 year old female who is seen today for management of multiple medical problems     #SDH - PT/OT, acetaminophen 975mg TID  #HTN / HLD - amlodipine 5mg daily, atorvastatin 20mg QHS  #Constipation - miralax 17grams daily PRN, senna-S 8.6/50mg daily  #OAB - tolterodine 1mg BID  #Depression - mgmt per psych - fluoxetine, depakote, trazodone, lorazepam, buspar  #ACP - DNRCC-A    Follow up as needed      *Chief Complaint*     SDH    *History of Present Illness*    Patient is a 78 y/o female w/ PMH as below who presented to [facility] ED as an inter facility transfer from [facility] s/p presumed fall on 9/11.  reported he laid pt down in bed w/ side rails up and went to take the dogs out for a walk when he heard a loud thud on his way out and found the patient on the ground, states that she appeared somewhat confused however was awake.  called EMS and Pt was immobilized and transported to [facility]. AT OSH imaging consisted of CTH, and C-spine, Pt. also sustained a head lac the was primarily repaired in ED. Imaging revealed an acute SDH which prompted transfer to [facility] for trauma and Neurosurgical evaluation. NSGY recommended a follow up CTh which showed a worsening of the SDH and was transferred to the TICU for q1h neuro checks, and close monitoring. On 9/12 geriatrics was consulted for recurrent falls and made several recommendations regarding medication, dementia, and delirium management. On 9/13 pt was recommended moderate intensity SNF by therapy team. On 9/14 pt was transferred to [facility] and remained stable on 9/15-17. She was d/c on 9/18 to [facility] for rehab.     She is seen sitting up in her room today visiting with her  and denies any pain, HA, dizziness, n/v, CP, SOB, cough, abd pain, LUTS, or any other new c/o presently.     Alleriges - hydroxyzine, bactrim  PMH - vascular dementia, epilepsy, hypertension, hyperlipidemia, prior CVA   PSH - unreliable  FH -  unreliable  SocHx - Non smoker, No EtOH    *Review of Systems*  All other systems reviewed are negative except as noted in the HPI     *Vital Signs*   Date: 9/28/23  - T: 98.6  P: 75  R: 16  BP: 111/68  SpO2: 97% on RA     *Results / Data*  CBC - Date: 9/20/23  WBC: 6.28  HGB: 12.5  HCT: 36.9  PLT: 349  ;   BMP - Date: 9/20/23  Na: 140  K: 3.9  Cl: 102  CO2: 30  BUN: 25  Cr: 0.63  Glu: 104  Ca: 8.6  ;   LFT - Date: 9/20/23  AST: 23  ALT: 12  ALP: 88  TBili: 0.3  ;   Coags - Date:   INR:   PT:    *Physical Exam*  Gen: (+) NAD, (+) well-appearing  HEENT: (+) normocephalic, (+) MMM, (+) EOMI, PERRL  Neck: (+) supple  Lungs: (+) CTAB, (-) wheezes, (-) rales, (-) rhonchi  Heart: (+) RRR, (+) S1 S2, (-) murmurs  Pulses: (+) palpable  Abd: (+) soft, (+) NT, (+) ND, (+) BS+  Ext: (-) edema, (-) deformity  MSK: (-) joint swelling  Skin: (+) warm, (+) dry, (-) rash  Neuro: (+) follows commands, (-) tremor, (+) alert

## 2023-10-02 NOTE — PROGRESS NOTES
"Ashley Lopez is a 77 y.o. female on day 2 of admission presenting with Subarachnoid bleed (CMS/HCC). PMHx is relevant for vascular dementia, epilepsy on valproic acid, HTN, prior CVAs who presents to [facility] on 9/28 for AMS with likely underlying UTI. Recent history is relevant for recent fall and admission on 9/11 for subdural hematoma, treated conservatively, and baseline confusion and disorientation.     Subjective   Per night team, pt was in and out of sleep and trying to get out of bed (bed alarm kept going off) and BPs were elevated, up to 180s/120s at 6am. Otherwise, NAEON. Pt is seen at bedside, sitting upright, eating breakfast, AxO1 as she is able to state her full name and mentions feeling better. Overall looking much better and mental alertness is improving.        Objective     Physical Exam  Constitutional:       General: She is awake.      Comments: Awake and oriented to self (able to state her name). Knows she is not currently \"at home\" but cannot state exact location or year.  Cardiovascular:      Rate and Rhythm: Normal rate and regular rhythm.      Heart sounds: Normal heart sounds.   Pulmonary:      Effort: Pulmonary effort is normal.      Breath sounds: Normal breath sounds.   Abdominal:      General: Bowel sounds are normal. There is no distension.      Palpations: Abdomen is soft. There is no mass.      Tenderness: There is no abdominal tenderness.   Genitourinary: Pt has an external urinary catheter.  Musculoskeletal:      Right lower leg: No edema.      Left lower leg: No edema.   Skin:     General: Skin is warm and dry.   Neurological:      Mental Status: She is disoriented.      Comments: Right sided weakness. Can wiggle her toes bilaterally but L>R  Psychiatric:         Cognition and Memory: Cognition is impaired. Memory is impaired.      Comments: Patient is alert and responds to a few questions, but otherwise not oriented to time or place.    Last Recorded Vitals  Blood pressure (!) " "191/109, pulse 78, temperature 36.5 °C (97.7 °F), resp. rate 18, height 1.676 m (5' 6\"), weight 63.5 kg (140 lb), SpO2 99 %.  Intake/Output last 3 Shifts:  I/O last 3 completed shifts:  In: - (0 mL/kg)   Out: 250 (3.9 mL/kg) [Urine:250 (0.1 mL/kg/hr)]  Weight: 63.5 kg     Relevant Results                Scheduled medications  amLODIPine, 5 mg, oral, Daily  atorvastatin, 20 mg, oral, Daily  divalproex sprinkle, 250 mg, oral, q8h JALIL  FLUoxetine, 20 mg, oral, Daily  melatonin, 3 mg, oral, Nightly  polyethylene glycol, 17 g, oral, Daily  sennosides-docusate sodium, 2 tablet, oral, BID  thiamine, 500 mg, intravenous, Daily      Continuous medications     PRN medications  PRN medications: acetaminophen **OR** acetaminophen, polyethylene glycol    Results for orders placed or performed during the hospital encounter of 09/29/23 (from the past 24 hour(s))   CBC   Result Value Ref Range    WBC 5.8 4.4 - 11.3 x10*3/uL    nRBC 0.0 0.0 - 0.0 /100 WBCs    RBC 4.10 4.00 - 5.20 x10*6/uL    Hemoglobin 12.6 12.0 - 16.0 g/dL    Hematocrit 38.7 36.0 - 46.0 %    MCV 94 80 - 100 fL    MCH 30.7 26.0 - 34.0 pg    MCHC 32.6 32.0 - 36.0 g/dL    RDW 14.6 (H) 11.5 - 14.5 %    Platelets 451 (H) 150 - 450 x10*3/uL    MPV 10.4 7.5 - 11.5 fL   Renal function panel   Result Value Ref Range    Glucose 96 74 - 99 mg/dL    Sodium 139 136 - 145 mmol/L    Potassium 3.9 3.5 - 5.3 mmol/L    Chloride 99 98 - 107 mmol/L    Bicarbonate 32 21 - 32 mmol/L    Anion Gap 12 10 - 20 mmol/L    Urea Nitrogen 8 6 - 23 mg/dL    Creatinine 0.42 (L) 0.50 - 1.05 mg/dL    eGFR >90 >60 mL/min/1.73m*2    Calcium 9.2 8.6 - 10.6 mg/dL    Phosphorus 3.7 2.5 - 4.9 mg/dL    Albumin 3.7 3.4 - 5.0 g/dL   Magnesium   Result Value Ref Range    Magnesium 2.24 1.60 - 2.40 mg/dL     CT head wo IV contrast    Result Date: 9/29/2023  Interpreted By:  SHAI CENTENO MD and PENNY WYNNE MD MRN: [id number] Patient Name: HARLEY HAWK  STUDY: CT HEAD WO CONTRAST;  9/29/2023 6:15 pm  " INDICATION: repeat CT head for SAH and SDH, Lie Flat: Yes .  COMPARISON: CT head 09/28/2023  ACCESSION NUMBER(S): [id number]  ORDERING CLINICIAN: NINA DOWNING      1. The size and mass-effect from the subacute right subdural hemorrhage is similar to the prior exam. 2. Subarachnoid hemorrhage remains along the left central sulcus. 3. Similar appearance of small focus of hemorrhage along the right frontal lobe.  I personally reviewed the images/study and I agree with the findings as stated. This study was interpreted at [facility], [place], [place].         Assessment/Plan   Principal Problem:    Subarachnoid bleed (CMS/HCC)    Ashley Lopez is a 77-year-old female with a PMHx  of vascular dementia, epilepsy, HTN, HLD, prior CVAs (x4), presenting to [facility] due to abnormal findings on follow-up CT scan, concerning for new SAH on chronic SDH from a previous fall on 9/11/2023 with U/A findings concerning for possible UTI.     The etiology of AMS is likely multifactorial, but mainly SDH on top of her baseline dementia, although patient has a history of recurrent UTIs. Per recent history, there has been no concern for seizure. When speaking to SNF, it appears that the pt received Ativan (0.5mg), Trazodone (50mg) and Buspirone (5mg) while in the assisted living home likely due to sundowning. It is possible that the polypharmacy has contributed to her worsening AMS.     Updates 10/2  -Improving since yesterday, is now able to state her full name.  -Repeat U/A (now U/Ax2) unchanged: no pyuria, positive nitrite, trace LE, 1+ blood w/o RBCs  -UCx results showed growth of E.coli and E. Faecalis; sensitivities pending  -Will likely start pt on 3d course of Nitrofurantoin 100mg BID  -Pt remains afebrile and w/o leukocytosis (WBC 5.8)  -Hgb stable, 12.6  -Neurosurgery approved restarting DVT ppx  -No seizure activity clinically nor on EEG from 9/29 per Neuro  -Concern that pt was prescribed Lovenox  during SNF stay; will have to discharge w/o AC and encourage compression socks and SCDs for DVT ppx to avoid further bleed in setting of new SAH this admission  -Due to sundowning and episodic agitation overnight, consider 25mg Seroquel PRN at night     #AMS  - CT head obtained in the ED showing chronic stable SDH and SAH along L central sulcus and R frontal lobe  - Per Neuro, EEG revealed theta stupor and amplitudinal asymmetry consistent w/ encephalopathy and known SDH. No epileptiform activity seen. EEG dc'ed.  - Swallow study was done and revealed to be normal; patient now on a regular diet  [] Continue home VPA 250mg TID  [] Continue thiamine 500mg IVPB TID x3 days, followed by thiamine 100mg PO qd for 2 weeks  [] DVT ppx good to restart per Neurosurgery  [] Avoid disturbances at night, encourage at least 6hrs of uninterrupted sleep, avoid benzodiazepines, sedatives, anti-cholinergics, restraints;  [] Add 25mg Seroquel PRN at night per agitation/sundowning and monitor response     #UTI  - U/A from the ED showed positive nitrites and trace LE although w/o pyuria  - Repeat U/A on 9/30 showed no changes from initial  - Urine cx revealing >100,000 units of E. Coli and E. Faecalis as of 10/2  - Patient afebrile and w/ no leukocytosis: WBC 6.5 (10/1)   - One dose of Ceftriaxone given in the ED, now discontinued  [] Begin UTI treatment w/ Nitrofurantoin 100mg PO BID for 5 day course   [] Will narrow abx regimen if needed when E. Faecalis sensitivities come back     F: PRN  E: PRN  N: regular diet  A: PIV     GI ppx: not indicated  DVT ppx: SubQ heparin (allowed per Neuro on 10/1)              Sera Duran

## 2023-10-03 PROBLEM — I63.9 CEREBRAL INFARCTION, UNSPECIFIED (MULTI): Status: ACTIVE | Noted: 2023-10-03

## 2023-10-03 PROBLEM — R45.1 AGITATION: Status: RESOLVED | Noted: 2023-06-02 | Resolved: 2023-10-03

## 2023-10-03 LAB
ALBUMIN SERPL BCP-MCNC: 3.9 G/DL (ref 3.4–5)
ANION GAP SERPL CALC-SCNC: 14 MMOL/L (ref 10–20)
BUN SERPL-MCNC: 15 MG/DL (ref 6–23)
CALCIUM SERPL-MCNC: 9.5 MG/DL (ref 8.6–10.6)
CHLORIDE SERPL-SCNC: 97 MMOL/L (ref 98–107)
CO2 SERPL-SCNC: 30 MMOL/L (ref 21–32)
CREAT SERPL-MCNC: 0.42 MG/DL (ref 0.5–1.05)
ERYTHROCYTE [DISTWIDTH] IN BLOOD BY AUTOMATED COUNT: 14.7 % (ref 11.5–14.5)
GFR SERPL CREATININE-BSD FRML MDRD: >90 ML/MIN/1.73M*2
GLUCOSE SERPL-MCNC: 92 MG/DL (ref 74–99)
HCT VFR BLD AUTO: 43.2 % (ref 36–46)
HGB BLD-MCNC: 13.9 G/DL (ref 12–16)
MAGNESIUM SERPL-MCNC: 2.27 MG/DL (ref 1.6–2.4)
MCH RBC QN AUTO: 30.3 PG (ref 26–34)
MCHC RBC AUTO-ENTMCNC: 32.2 G/DL (ref 32–36)
MCV RBC AUTO: 94 FL (ref 80–100)
NRBC BLD-RTO: 0 /100 WBCS (ref 0–0)
PHOSPHATE SERPL-MCNC: 4.3 MG/DL (ref 2.5–4.9)
PLATELET # BLD AUTO: 382 X10*3/UL (ref 150–450)
PMV BLD AUTO: 11.2 FL (ref 7.5–11.5)
POTASSIUM SERPL-SCNC: 4 MMOL/L (ref 3.5–5.3)
RBC # BLD AUTO: 4.58 X10*6/UL (ref 4–5.2)
SODIUM SERPL-SCNC: 137 MMOL/L (ref 136–145)
WBC # BLD AUTO: 6.4 X10*3/UL (ref 4.4–11.3)

## 2023-10-03 PROCEDURE — 36415 COLL VENOUS BLD VENIPUNCTURE: CPT

## 2023-10-03 PROCEDURE — 99232 SBSQ HOSP IP/OBS MODERATE 35: CPT | Performed by: INTERNAL MEDICINE

## 2023-10-03 PROCEDURE — 1100000001 HC PRIVATE ROOM DAILY

## 2023-10-03 PROCEDURE — 9990 CHARGE CONVERSION

## 2023-10-03 PROCEDURE — 84100 ASSAY OF PHOSPHORUS: CPT

## 2023-10-03 PROCEDURE — 85027 COMPLETE CBC AUTOMATED: CPT

## 2023-10-03 PROCEDURE — 83735 ASSAY OF MAGNESIUM: CPT

## 2023-10-03 PROCEDURE — 99285 EMERGENCY DEPT VISIT HI MDM: CPT

## 2023-10-03 PROCEDURE — 2500000004 HC RX 250 GENERAL PHARMACY W/ HCPCS (ALT 636 FOR OP/ED)

## 2023-10-03 PROCEDURE — 2500000004 HC RX 250 GENERAL PHARMACY W/ HCPCS (ALT 636 FOR OP/ED): Performed by: STUDENT IN AN ORGANIZED HEALTH CARE EDUCATION/TRAINING PROGRAM

## 2023-10-03 PROCEDURE — 2500000001 HC RX 250 WO HCPCS SELF ADMINISTERED DRUGS (ALT 637 FOR MEDICARE OP)

## 2023-10-03 RX ORDER — HYDRALAZINE HYDROCHLORIDE 25 MG/1
25 TABLET, FILM COATED ORAL 3 TIMES DAILY PRN
Status: DISCONTINUED | OUTPATIENT
Start: 2023-10-03 | End: 2023-10-04 | Stop reason: HOSPADM

## 2023-10-03 RX ORDER — NITROFURANTOIN 25; 75 MG/1; MG/1
100 CAPSULE ORAL EVERY 12 HOURS SCHEDULED
Qty: 9 CAPSULE | Refills: 0 | Status: SHIPPED | OUTPATIENT
Start: 2023-10-02 | End: 2023-10-07

## 2023-10-03 RX ORDER — NITROFURANTOIN 25; 75 MG/1; MG/1
100 CAPSULE ORAL EVERY 12 HOURS SCHEDULED
Qty: 9 CAPSULE | Refills: 0
Start: 2023-10-03 | End: 2023-10-03 | Stop reason: SDUPTHER

## 2023-10-03 RX ORDER — LANOLIN ALCOHOL/MO/W.PET/CERES
100 CREAM (GRAM) TOPICAL DAILY
Qty: 14 TABLET | Refills: 0
Start: 2023-09-30 | End: 2023-10-14

## 2023-10-03 RX ADMIN — QUETIAPINE FUMARATE 25 MG: 25 TABLET ORAL at 20:47

## 2023-10-03 RX ADMIN — POLYETHYLENE GLYCOL 3350 17 G: 17 POWDER, FOR SOLUTION ORAL at 08:35

## 2023-10-03 RX ADMIN — FLUOXETINE HYDROCHLORIDE 20 MG: 20 CAPSULE ORAL at 08:35

## 2023-10-03 RX ADMIN — DIVALPROEX SODIUM 250 MG: 125 CAPSULE, COATED PELLETS ORAL at 22:33

## 2023-10-03 RX ADMIN — SENNOSIDES AND DOCUSATE SODIUM 2 TABLET: 8.6; 5 TABLET ORAL at 08:35

## 2023-10-03 RX ADMIN — SENNOSIDES AND DOCUSATE SODIUM 2 TABLET: 8.6; 5 TABLET ORAL at 20:47

## 2023-10-03 RX ADMIN — ACETAMINOPHEN 650 MG: 650 SOLUTION ORAL at 14:32

## 2023-10-03 RX ADMIN — Medication 3 MG: at 20:47

## 2023-10-03 RX ADMIN — THIAMINE HYDROCHLORIDE 500 MG: 100 INJECTION, SOLUTION INTRAMUSCULAR; INTRAVENOUS at 10:45

## 2023-10-03 RX ADMIN — NITROFURANTOIN MONOHYDRATE/MACROCRYSTALS 100 MG: 25; 75 CAPSULE ORAL at 15:56

## 2023-10-03 RX ADMIN — NITROFURANTOIN MONOHYDRATE/MACROCRYSTALS 100 MG: 25; 75 CAPSULE ORAL at 06:00

## 2023-10-03 RX ADMIN — ATORVASTATIN CALCIUM 20 MG: 20 TABLET ORAL at 08:35

## 2023-10-03 RX ADMIN — AMLODIPINE BESYLATE 5 MG: 5 TABLET ORAL at 08:35

## 2023-10-03 ASSESSMENT — PAIN SCALES - WONG BAKER
WONGBAKER_NUMERICALRESPONSE: HURTS LITTLE BIT
WONGBAKER_NUMERICALRESPONSE: HURTS LITTLE BIT

## 2023-10-03 ASSESSMENT — COGNITIVE AND FUNCTIONAL STATUS - GENERAL
HELP NEEDED FOR BATHING: A LITTLE
MOVING TO AND FROM BED TO CHAIR: A LITTLE
STANDING UP FROM CHAIR USING ARMS: A LOT
PERSONAL GROOMING: A LITTLE
TURNING FROM BACK TO SIDE WHILE IN FLAT BAD: A LITTLE
TOILETING: A LITTLE
MOBILITY SCORE: 17
MOVING FROM LYING ON BACK TO SITTING ON SIDE OF FLAT BED WITH BEDRAILS: A LITTLE
DRESSING REGULAR UPPER BODY CLOTHING: A LITTLE
STANDING UP FROM CHAIR USING ARMS: A LOT
CLIMB 3 TO 5 STEPS WITH RAILING: A LITTLE
WALKING IN HOSPITAL ROOM: A LITTLE
WALKING IN HOSPITAL ROOM: A LITTLE
CLIMB 3 TO 5 STEPS WITH RAILING: A LITTLE
DRESSING REGULAR LOWER BODY CLOTHING: A LITTLE
EATING MEALS: A LITTLE
TOILETING: A LITTLE
DAILY ACTIVITIY SCORE: 18
HELP NEEDED FOR BATHING: A LITTLE
MOVING TO AND FROM BED TO CHAIR: A LITTLE
TURNING FROM BACK TO SIDE WHILE IN FLAT BAD: A LITTLE
EATING MEALS: A LITTLE
DRESSING REGULAR UPPER BODY CLOTHING: A LITTLE
DAILY ACTIVITIY SCORE: 18
DRESSING REGULAR LOWER BODY CLOTHING: A LITTLE
PERSONAL GROOMING: A LITTLE
MOVING FROM LYING ON BACK TO SITTING ON SIDE OF FLAT BED WITH BEDRAILS: A LITTLE
MOBILITY SCORE: 17

## 2023-10-03 ASSESSMENT — PAIN SCALES - GENERAL
PAINLEVEL_OUTOF10: 0 - NO PAIN
PAINLEVEL_OUTOF10: 0 - NO PAIN

## 2023-10-03 ASSESSMENT — PAIN - FUNCTIONAL ASSESSMENT
PAIN_FUNCTIONAL_ASSESSMENT: 0-10
PAIN_FUNCTIONAL_ASSESSMENT: 0-10

## 2023-10-03 NOTE — CARE PLAN
Problem: Fall/Injury  Goal: Verbalize understanding of personal risk factors for fall in the hospital  Outcome: Not Progressing  Goal: Verbalize understanding of risk factor reduction measures to prevent injury from fall in the home  Outcome: Not Progressing     Problem: Fall/Injury  Goal: Not fall by end of shift  Outcome: Progressing  Goal: Be free from injury by end of the shift  Outcome: Progressing   The patient's goals for the shift include      The clinical goals for the shift include free from falls throughout shift    Over the shift, the patient did not make progress toward the following goals. Barriers to progression include Pt mental status. Recommendations to address these barriers include Reorient .    Problem: Fall/Injury  Goal: Verbalize understanding of personal risk factors for fall in the hospital  Outcome: Not Progressing  Goal: Verbalize understanding of risk factor reduction measures to prevent injury from fall in the home  Outcome: Not Progressing

## 2023-10-03 NOTE — CARE PLAN
The patient's goals for the shift include      The clinical goals for the shift include      Pt has Sitter at the bedside.  Pt will remain safe and free of all injuries.    Problem: Safety - Adult  Goal: Free from fall injury  Outcome: Progressing     Problem: Skin  Goal: Decreased wound size/increased tissue granulation at next dressing change  Outcome: Progressing  Goal: Participates in plan/prevention/treatment measures  Outcome: Progressing  Goal: Prevent/manage excess moisture  Outcome: Progressing  Goal: Prevent/minimize sheer/friction injuries  Outcome: Progressing  Goal: Promote/optimize nutrition  Outcome: Progressing  Goal: Promote skin healing  Outcome: Progressing

## 2023-10-03 NOTE — PROGRESS NOTES
"Per MD, patient will stay until tomorrow for monitoring of BP.  requesting patient return to [facility] in [facility]. Referral placed to [facility]. Aria Castro RN, TCC    "

## 2023-10-03 NOTE — PROGRESS NOTES
"Ashley Lopez is a 77 y.o. female on day 3 of admission presenting with Subarachnoid bleed (CMS/[facility]). PMHx is relevant for vascular dementia, epilepsy on valproic acid, HTN, prior CVAs who presents to [facility] on 9/28 for AMS with likely underlying UTI. Recent history is relevant for recent fall and admission on 9/11 for subdural hematoma, treated conservatively, and baseline confusion and disorientation.      Subjective   Patient is seen at bedside, no changes in mental status from yesterday. Slept more and experienced less agitation after receiving the seroquel 25mg last night. Pt is HDS and denies any pain or discomfort. No SOB or chest pain, no dizziness, no HA.   Blood pressures have been consistently elevated in the early AM, today at 179/105 prior to receiving her home amlodipine 5mg. Pt is asymptomatic.     is seen in the room w/ pt at ~11:30 and discusses he would like pt to be discharged with adequate medications to manage agitation PRN for both the SNF and at home. He also requests PT at home.        Objective     Physical Exam  Constitutional:       General: She is awake.      Comments: Knows she is not currently \"at home\" but cannot state exact location or year. Today, more sleepy and cannot state her full name.  Cardiovascular:      Rate and Rhythm: Normal rate and regular rhythm.      Heart sounds: Normal heart sounds.   Pulmonary:      Effort: Pulmonary effort is normal.      Breath sounds: Normal breath sounds.   Abdominal:      General: Bowel sounds are normal. There is no distension.      Palpations: Abdomen is soft. There is no mass.      Tenderness: There is no abdominal tenderness.   Genitourinary: Pt has an external urinary catheter draining yellow urine.  Musculoskeletal:      Right lower leg: No edema.      Left lower leg: No edema.   Skin:     General: Skin is warm and dry.   Neurological:      Mental Status: She is disoriented.      Comments: Residual right sided weakness. Can " "wiggle her toes bilaterally but L>R  Psychiatric:         Cognition and Memory: Cognition is impaired. Memory is impaired.      Comments: Patient is alert and responds to a few questions, but otherwise not oriented to time or place.       Last Recorded Vitals  Blood pressure 137/66, pulse 83, temperature 36 °C (96.8 °F), resp. rate 17, height 1.676 m (5' 6\"), weight 63.5 kg (140 lb), SpO2 97 %.  Intake/Output last 3 Shifts:  I/O last 3 completed shifts:  In: - (0 mL/kg)   Out: 300 (4.7 mL/kg) [Urine:300 (0.1 mL/kg/hr)]  Weight: 63.5 kg     Relevant Results              Scheduled medications  amLODIPine, 5 mg, oral, Daily  atorvastatin, 20 mg, oral, Daily  divalproex sprinkle, 250 mg, oral, q8h JALIL  FLUoxetine, 20 mg, oral, Daily  melatonin, 3 mg, oral, Nightly  nitrofurantoin (macrocrystal-monohydrate), 100 mg, oral, q12h JALIL  polyethylene glycol, 17 g, oral, Daily  sennosides-docusate sodium, 2 tablet, oral, BID  thiamine, 500 mg, intravenous, Daily    Continuous medications     PRN medications  PRN medications: acetaminophen **OR** acetaminophen, hydrALAZINE, polyethylene glycol, QUEtiapine    Labs from 10/3:  Results for orders placed or performed during the hospital encounter of 09/29/23 (from the past 24 hour(s))   CBC   Result Value Ref Range    WBC 6.4 4.4 - 11.3 x10*3/uL    nRBC 0.0 0.0 - 0.0 /100 WBCs    RBC 4.58 4.00 - 5.20 x10*6/uL    Hemoglobin 13.9 12.0 - 16.0 g/dL    Hematocrit 43.2 36.0 - 46.0 %    MCV 94 80 - 100 fL    MCH 30.3 26.0 - 34.0 pg    MCHC 32.2 32.0 - 36.0 g/dL    RDW 14.7 (H) 11.5 - 14.5 %    Platelets 382 150 - 450 x10*3/uL    MPV 11.2 7.5 - 11.5 fL   Magnesium   Result Value Ref Range    Magnesium 2.27 1.60 - 2.40 mg/dL   Renal function panel   Result Value Ref Range    Glucose 92 74 - 99 mg/dL    Sodium 137 136 - 145 mmol/L    Potassium 4.0 3.5 - 5.3 mmol/L    Chloride 97 (L) 98 - 107 mmol/L    Bicarbonate 30 21 - 32 mmol/L    Anion Gap 14 10 - 20 mmol/L    Urea Nitrogen 15 6 - 23 " mg/dL    Creatinine 0.42 (L) 0.50 - 1.05 mg/dL    eGFR >90 >60 mL/min/1.73m*2    Calcium 9.5 8.6 - 10.6 mg/dL    Phosphorus 4.3 2.5 - 4.9 mg/dL    Albumin 3.9 3.4 - 5.0 g/dL          Assessment/Plan   Ashley Lopez is a 77-year-old female with a PMHx  of vascular dementia, epilepsy, HTN, HLD, prior CVAs (x4), presenting to [facility] due to abnormal findings on follow-up CT scan, concerning for new SAH on chronic SDH from a previous fall on 9/11/2023 with U/A findings concerning for possible UTI.     The etiology of AMS is likely multifactorial, but mainly SDH on top of her baseline dementia, although patient has a history of recurrent UTIs. Per recent history, there has been no concern for seizure. When speaking to SNF, it appears that the pt received Ativan (0.5mg), Trazodone (50mg) and Buspirone (5mg) while in the assisted living home likely due to sundowning. It is possible that the polypharmacy has contributed to her worsening AMS.     Updates 10/3  -Pt is stable and mental status and orientation are at baseline  -Repeat U/A (now U/Ax2) unchanged: no pyuria, positive nitrite, trace LE, 1+ blood w/o RBCs  -UCx results showed growth of E.coli and E. Faecalis; sensitivities pending  -Pt started on 5d course of Nitrofurantoin 100mg BID  -Pt remains afebrile and w/o leukocytosis (WBC 6.4)  -Hgb stable, 13.9  -Neurosurgery approved restarting DVT ppx  -No seizure activity clinically nor on EEG from 9/29 per Neuro  -Concern that pt was prescribed Lovenox during SNF stay; will have to discharge w/o AC and encourage compression socks and SCDs for DVT ppx to avoid further bleed in setting of new SAH this admission  -Due to sundowning and episodic agitation overnight, consider 25mg Seroquel PRN at night    #HTN  -Pt consistently hypertensive in the mornings, prior to receiving PO 5mg amlodipine  -Goal of SBPs ~130s  [] Please add hydralazine 25mg q8h PRN if pt's BP is >180     #AMS  - CT head obtained in the ED showing  chronic stable SDH and SAH along L central sulcus and R frontal lobe  - Per Neuro, EEG revealed theta stupor and amplitudinal asymmetry consistent w/ encephalopathy and known SDH. No epileptiform activity seen. EEG dc'ed.  - Swallow study was done and revealed to be normal; patient now on a regular diet  [] Continue home VPA 250mg TID  [] Continue thiamine 500mg IVPB TID x3 days, followed by thiamine 100mg PO qd for 2 weeks  [] DVT ppx good to restart per Neurosurgery  [] Avoid disturbances at night, encourage at least 6hrs of uninterrupted sleep, avoid benzodiazepines, sedatives, anti-cholinergics, restraints;  [] Add 25mg Seroquel PRN at night per agitation/sundowning and monitor response     #UTI  - U/A from the ED showed positive nitrites and trace LE although w/o pyuria  - Repeat U/A on 9/30 showed no changes from initial  - Urine cx revealing >100,000 units of E. Coli and E. Faecalis as of 10/2  - Patient afebrile and w/ no leukocytosis: WBC 6.5 (10/3)   - One dose of Ceftriaxone given in the ED, now discontinued  [] Begin UTI treatment w/ Nitrofurantoin 100mg PO BID for 5 day course      F: PRN  E: PRN  N: regular diet  A: PIV     GI ppx: not indicated  DVT ppx: SubQ heparin (allowed per Neuro on 10/1)    Code: DNR/comfort care  POA: Dennis Lopez ()             Sera Duran

## 2023-10-03 NOTE — DISCHARGE SUMMARY
"Discharge Diagnosis  Subarachnoid bleed (CMS/HCC)    Issues Requiring Follow-Up  Recent hospitalization for new SAH and underlying UTI.    Test Results Pending At Discharge  Pending Labs       No current pending labs.            Hospital Course  Ashley Lopez is a 77-year-old female with a PMHx  of vascular dementia, epilepsy on valproic acid, HTN, HLD, prior CVAs (x4), presenting to [facility] after reportedly having worsening mental status (according to SNF), new SAH, and worsening of previous SDH on f/u OP CT. Of note, she was recently admitted due to SDH after a fall (9/11) and discharged to [facility] [place]. In the ED, patient was A&Ox0 and not following command. Repeat CT showed stable SAH with no further intervention required per Neurosurgery. EEG obtained and showed findings consistent w/ encephalopathy and known SDH but w/o epileptiform activity. Valproate was continued for seizure prophylaxis, and thiamine added (100mg daily until 10/14). U/A was obtained and revealed trace LE and positive nitrites but no pyuria (15 WBCs). External urethral cath placed. Concern for UTI with culture revealing E. Coli and E. Faecalis. Patient initiated on Nitrofurantoin 100mg PO BID on 10/2 evening for a 5 day course (until 10/7). Ativan, Trazodone and Buspirone from nursing home were discontinued on arrival as polypharmacy may have contributed to AMS. On 10/2, there were concerns for continuing evening agitation due to sundowning and pt attempting to leave the bed, so seroquel 25mg PO was added PRN on evenings. Pt is back at her baseline and w/o complaints. Patient is ready for discharge to [place] for rehabilitation. Medication changes per AVS. We also recommend following-up with Neurosurgery for SAH and with PCP for UTI and general wellness.     Pertinent Physical Exam At Time of Discharge  Physical Exam  Constitutional:       General: She is awake. AxOx1     Comments: Knows she is not currently \"at home\" but cannot " state exact location or year. Able to state her full name.  Cardiovascular:      Rate and Rhythm: Normal rate and regular rhythm.      Heart sounds: Normal heart sounds.   Pulmonary:      Effort: Pulmonary effort is normal.      Breath sounds: Normal breath sounds.   Abdominal:      General: Bowel sounds are normal. There is no distension.      Palpations: Abdomen is soft. There is no mass.      Tenderness: There is no abdominal tenderness.   Genitourinary: Pt has an external urinary catheter draining dark yellow urine.  Musculoskeletal:      Right lower leg: No edema.      Left lower leg: No edema.   Skin:     General: Skin is warm and dry.   Neurological:      Mental Status: She is disoriented.      Comments: Residual right sided weakness. Can wiggle her toes bilaterally but L>R  Psychiatric:         Cognition and Memory: Cognition is impaired. Memory is impaired.      Comments: Patient is alert and responds to a few questions, but otherwise not oriented to time or place.    Home Medications     Medication List      START taking these medications     nitrofurantoin (macrocrystal-monohydrate) 100 mg capsule; Commonly known   as: Macrobid; Take 1 capsule (100 mg) by mouth every 12 hours for 9 doses.   Start date= 10/2/23 end date: 10/7/23 (should have only 1 dose left to   take on 10/7/23)   thiamine 100 mg tablet; Commonly known as: Vitamin B-1; Take 1 tablet   (100 mg) by mouth once daily for 14 days.     CHANGE how you take these medications     melatonin 5 mg capsule; Take 1 capsule by mouth once daily at bedtime.;   What changed: medication strength, how much to take, when to take this,   reasons to take this     CONTINUE taking these medications     acetaminophen 650 mg ER tablet; Commonly known as: Tylenol 8 HOUR   amLODIPine 5 mg tablet; Commonly known as: Norvasc   atorvastatin 20 mg tablet; Commonly known as: Lipitor   busPIRone 5 mg tablet; Commonly known as: Buspar   FLUoxetine 20 mg capsule; Commonly  known as: PROzac   polyethylene glycol 17 gram/dose powder; Commonly known as: Glycolax,   Miralax   QUEtiapine 25 mg tablet; Commonly known as: SEROquel   sennosides-docusate sodium 8.6-50 mg tablet; Commonly known as:   Eli-Colace   valproic acid 250 mg capsule; Commonly known as: Depakene     STOP taking these medications     haloperidol 1 mg tablet; Commonly known as: Haldol   LORazepam 0.5 mg tablet; Commonly known as: Ativan   traZODone 50 mg tablet; Commonly known as: Desyrel       Outpatient Follow-Up  Future Appointments   Date Time Provider Department Center   10/10/2023 10:30 AM [id number] [id number] Academic       Sera Duran

## 2023-10-04 VITALS
OXYGEN SATURATION: 97 % | RESPIRATION RATE: 21 BRPM | WEIGHT: 140 LBS | DIASTOLIC BLOOD PRESSURE: 86 MMHG | BODY MASS INDEX: 22.5 KG/M2 | HEIGHT: 66 IN | TEMPERATURE: 97.9 F | HEART RATE: 88 BPM | SYSTOLIC BLOOD PRESSURE: 134 MMHG

## 2023-10-04 LAB
APPEARANCE, URINE: NORMAL
ASCORBIC ACID: NORMAL MG/DL
BILIRUBIN, URINE: NORMAL
BLOOD, URINE: NORMAL
COLOR, URINE: NORMAL
GLUCOSE, URINE: NORMAL
KETONES, URINE: NORMAL
LEUKOCYTE ESTERASE, URINE: NORMAL
NITRITE, URINE: NORMAL
PH, URINE: NORMAL
PROTEIN, URINE: NORMAL
SPECIFIC GRAVITY, URINE: NORMAL
UROBILINOGEN, URINE: NORMAL

## 2023-10-04 PROCEDURE — 82330 ASSAY OF CALCIUM: CPT

## 2023-10-04 PROCEDURE — 82947 ASSAY GLUCOSE BLOOD QUANT: CPT | Mod: 91

## 2023-10-04 PROCEDURE — 84132 ASSAY OF SERUM POTASSIUM: CPT | Mod: 91

## 2023-10-04 PROCEDURE — 9990 CHARGE CONVERSION: Mod: 91

## 2023-10-04 PROCEDURE — 83605 ASSAY OF LACTIC ACID: CPT

## 2023-10-04 PROCEDURE — 87635 SARS-COV-2 COVID-19 AMP PRB: CPT

## 2023-10-04 PROCEDURE — 2500000004 HC RX 250 GENERAL PHARMACY W/ HCPCS (ALT 636 FOR OP/ED): Performed by: STUDENT IN AN ORGANIZED HEALTH CARE EDUCATION/TRAINING PROGRAM

## 2023-10-04 PROCEDURE — 80053 COMPREHEN METABOLIC PANEL: CPT

## 2023-10-04 PROCEDURE — 85610 PROTHROMBIN TIME: CPT

## 2023-10-04 PROCEDURE — 2500000004 HC RX 250 GENERAL PHARMACY W/ HCPCS (ALT 636 FOR OP/ED)

## 2023-10-04 PROCEDURE — 85018 HEMOGLOBIN: CPT | Mod: 91

## 2023-10-04 PROCEDURE — 82435 ASSAY OF BLOOD CHLORIDE: CPT | Mod: 91

## 2023-10-04 PROCEDURE — 82805 BLOOD GASES W/O2 SATURATION: CPT

## 2023-10-04 PROCEDURE — 85730 THROMBOPLASTIN TIME PARTIAL: CPT

## 2023-10-04 PROCEDURE — 2500000001 HC RX 250 WO HCPCS SELF ADMINISTERED DRUGS (ALT 637 FOR MEDICARE OP)

## 2023-10-04 PROCEDURE — 84295 ASSAY OF SERUM SODIUM: CPT | Mod: 91

## 2023-10-04 PROCEDURE — 80164 ASSAY DIPROPYLACETIC ACD TOT: CPT

## 2023-10-04 PROCEDURE — 85025 COMPLETE CBC W/AUTO DIFF WBC: CPT

## 2023-10-04 PROCEDURE — 95812 EEG 41-60 MINUTES: CPT

## 2023-10-04 RX ADMIN — POLYETHYLENE GLYCOL 3350 17 G: 17 POWDER, FOR SOLUTION ORAL at 08:58

## 2023-10-04 RX ADMIN — THIAMINE HYDROCHLORIDE 500 MG: 100 INJECTION, SOLUTION INTRAMUSCULAR; INTRAVENOUS at 08:58

## 2023-10-04 RX ADMIN — NITROFURANTOIN MONOHYDRATE/MACROCRYSTALS 100 MG: 25; 75 CAPSULE ORAL at 04:16

## 2023-10-04 RX ADMIN — SENNOSIDES AND DOCUSATE SODIUM 2 TABLET: 8.6; 5 TABLET ORAL at 08:57

## 2023-10-04 RX ADMIN — FLUOXETINE HYDROCHLORIDE 20 MG: 20 CAPSULE ORAL at 08:58

## 2023-10-04 RX ADMIN — AMLODIPINE BESYLATE 5 MG: 5 TABLET ORAL at 08:57

## 2023-10-04 RX ADMIN — ATORVASTATIN CALCIUM 20 MG: 20 TABLET ORAL at 08:57

## 2023-10-04 ASSESSMENT — COGNITIVE AND FUNCTIONAL STATUS - GENERAL
MOVING TO AND FROM BED TO CHAIR: A LITTLE
HELP NEEDED FOR BATHING: A LITTLE
TURNING FROM BACK TO SIDE WHILE IN FLAT BAD: A LITTLE
WALKING IN HOSPITAL ROOM: A LITTLE
MOBILITY SCORE: 17
TOILETING: A LITTLE
DRESSING REGULAR LOWER BODY CLOTHING: A LITTLE
MOVING FROM LYING ON BACK TO SITTING ON SIDE OF FLAT BED WITH BEDRAILS: A LITTLE
STANDING UP FROM CHAIR USING ARMS: A LOT
DRESSING REGULAR UPPER BODY CLOTHING: A LITTLE
EATING MEALS: A LITTLE
CLIMB 3 TO 5 STEPS WITH RAILING: A LITTLE
PERSONAL GROOMING: A LITTLE
DAILY ACTIVITIY SCORE: 18

## 2023-10-04 ASSESSMENT — PAIN - FUNCTIONAL ASSESSMENT: PAIN_FUNCTIONAL_ASSESSMENT: 0-10

## 2023-10-04 ASSESSMENT — PAIN SCALES - GENERAL: PAINLEVEL_OUTOF10: 0 - NO PAIN

## 2023-10-04 ASSESSMENT — PAIN SCALES - WONG BAKER: WONGBAKER_NUMERICALRESPONSE: NO HURT

## 2023-10-04 NOTE — CARE PLAN
Problem: Safety - Adult  Goal: Free from fall injury  Outcome: Progressing     Problem: Skin  Goal: Promote skin healing  Outcome: Progressing   The patient's goals for the shift include      The clinical goals for the shift include free from falls throughout shift    Over the shift, the patient did make progress toward the following goals. Barriers to progression include promoting skin healing. Recommendations to address these barriers include frequent turns and make sure pt stays dry.

## 2023-10-04 NOTE — PROGRESS NOTES
Per medical team, patient is medically ready to discharge to [facility]. Per medical team, transportation requested for 3pm. Facility notified. Nurse and  notified. Patient's  notified. Medical team working on completing discharge   documents. Aria Castro RN, TCC    Medical team requested transportation time be changed to 4pm to allow time for completion of [facility] and discharge paperwork. Per [facility], patient's  time is 4pm. Medical team notified. Aria Castro RN, TCC

## 2023-10-04 NOTE — CARE PLAN
Problem: Safety - Adult  Goal: Free from fall injury  Outcome: Progressing   The patient's goals for the shift include      The clinical goals for the shift include free from falls throughout shift    Over the shift, the patient did make progress toward the following goals. Barriers to progression include free from falls. Recommendations to address these barriers include bed alarm on and hourly rounding.

## 2023-10-04 NOTE — CARE PLAN
The patient's goals for the shift include      The clinical goals for the shift include Pt to remain safe      Problem: Skin  Goal: Decreased wound size/increased tissue granulation at next dressing change  Outcome: Met

## 2023-10-04 NOTE — CARE PLAN
Problem: Safety - Adult  Goal: Free from fall injury  Outcome: Progressing     Problem: Skin  Goal: Promote skin healing  Outcome: Progressing   The patient's goals for the shift include      The clinical goals for the shift include free from falls throughout shift    Over the shift, the patient did not make progress toward the following goals. Barriers to progression include turn frequently. Recommendations to address these barriers include making sure patient stays dry.

## 2023-10-04 NOTE — DISCHARGE INSTRUCTIONS
Dear Mrs. Lopez,    You were recently hospitalized at [facility] for concern of newfound bleed in your brain causing a change in your mental status. You were evaluated by Neurosurgery and Neurology teams that did not recommend any intervention. You got an EEG and no seizure activity was found. You were treated with your usual anti-seizure medication (Valproate) and received thiamine, which you will continue for a few days after discharge. While you were in the hospital, a urine analysis revealed signs of a urinary infection. For this, we treated you with oral antibiotics (Nitrofurantoin), which you will continue to take after discharge, twice a day every day until 10/7. Instructions about any other medication changes are provided in the After Visit Summary. Please follow-up with your Primary Care Provider and your Neurologist Dr. Satnam Maradiaga at the [facility]. We encourage you to engage in physical therapy multiple times a week, both at [facility] [place], and once you are back at home.    Thank you for choosing [facility] for your care.     Wishing you a speedy recovery,    [facility], Internal Medicine Team

## 2023-10-05 ENCOUNTER — NURSING HOME VISIT (OUTPATIENT)
Dept: POST ACUTE CARE | Facility: EXTERNAL LOCATION | Age: 78
End: 2023-10-05
Payer: MEDICARE

## 2023-10-05 DIAGNOSIS — N39.0 URINARY TRACT INFECTION WITH HEMATURIA, SITE UNSPECIFIED: ICD-10-CM

## 2023-10-05 DIAGNOSIS — S06.5XAA SDH (SUBDURAL HEMATOMA) (MULTI): ICD-10-CM

## 2023-10-05 DIAGNOSIS — G40.909 RECURRENT SEIZURES (MULTI): ICD-10-CM

## 2023-10-05 DIAGNOSIS — R31.9 URINARY TRACT INFECTION WITH HEMATURIA, SITE UNSPECIFIED: ICD-10-CM

## 2023-10-05 PROCEDURE — 99305 1ST NF CARE MODERATE MDM 35: CPT | Performed by: FAMILY MEDICINE

## 2023-10-05 NOTE — LETTER
Patient: Ashley Lopez  : 1945    Encounter Date: 10/05/2023    Ashley Lopez is a 77 y.o. female with Chief Complaint of SNF ([facility]) H&P    Resident seen 10/5/23 -- LIZBETH    CC: SNF ([facility]) Re-admit H&P    : 1945  SNF H&P done 23, 10/5/23 (EPIC)  Allergy: Hydroxyzine, Bactrim  DNR-CCA    S: 78 yo woman with seizure disorder, HTN, HLD, MDD, Hx TIA/CVA with progressive vascular dementia, falls, CHI, right brow lac readmitted to SNF rehab after interval hospitalization for ICH on follow up imaging. Dx'd with toxic metabolic encephalopathy and UTI. No intervention needed per NS. Stopped haldol, ativan, and trazodone.    O: VSS AFEB 102# (23) Awake, alert, pleasantly confused. NAD. Chest cta. heart rrr. Ext no c/c/e. Right brow lac healing. Resolving right periorbital ecchymosis.    LAB (10/3/23) K 4, Cr 0.42, Na 137, Phos 4.3, Alb 3.9, WBC 6.4, Hgb 13.9    A/P:  # UTI: macrobid started in hospital.  # Weakness/falls: SNF PT/OT. Goal to return home under 24/7 supervision by family. Lovenox until ambulatory.  # ICH: worsening SDH, stable SAH, no intervention per NS.  # Anxiety/MDD: fluoxetine & buspirone. OFF ativan prn.  # OAB: off anticholinergics. Previously on tolteridine at home.  # Insomnia: Off Trazodone. Melatonin.  # Toxic Metabolic Encephalopathy on Vascular Dementia) per EEG/neuro 10/2023: Seroquel 25 daily per [id number]. Off trazodone and ativan per hospital.  # Seizure disorder: stable on VPA. Thiamine x 2 weeks per neuro.  # HLD/Hx CVA: statin  # HTN: amlodipine 5 mg daily    Past Surgical History:   Procedure Laterality Date   • JOINT REPLACEMENT        Social History     Socioeconomic History   • Marital status:      Spouse name: Not on file   • Number of children: Not on file   • Years of education: Not on file   • Highest education level: Not on file   Occupational History   • Not on file   Tobacco Use   • Smoking status: Former     Types: Cigarettes   • Smokeless  tobacco: Never   Substance and Sexual Activity   • Alcohol use: Not on file   • Drug use: Not on file   • Sexual activity: Not on file   Other Topics Concern   • Not on file   Social History Narrative   • Not on file     Social Determinants of Health     Financial Resource Strain: Not on file   Food Insecurity: Not on file   Transportation Needs: Not on file   Physical Activity: Not on file   Stress: Not on file   Social Connections: Not on file   Intimate Partner Violence: Not on file   Housing Stability: Not on file     Past Medical History:   Diagnosis Date   • Arthritis    • Hypertension    • Skin cancer    • Vascular dementia (CMS/HCC)       Family History   Problem Relation Name Age of Onset   • No Known Problems Other          Review of Systems   Constitutional:  Negative for chills, fatigue and fever.   HENT:  Negative for rhinorrhea and sore throat.    Eyes:  Negative for pain and redness.   Respiratory:  Negative for cough and shortness of breath.    Cardiovascular:  Negative for chest pain and palpitations.   Gastrointestinal:  Negative for abdominal pain and blood in stool.   Endocrine: Negative for polydipsia and polyuria.   Genitourinary:  Negative for dysuria and hematuria.   Musculoskeletal:  Negative for back pain and neck stiffness.   Skin:  Positive for wound. Negative for rash.   Allergic/Immunologic: Negative for environmental allergies and food allergies.   Neurological:  Positive for weakness. Negative for headaches.   Hematological:  Negative for adenopathy. Does not bruise/bleed easily.   Psychiatric/Behavioral:  Positive for confusion. Negative for hallucinations and suicidal ideas.       LMP  (LMP Unknown) Comment: NO LONGER HAS A MENSTRUAL CYCLE  Physical Exam  Vitals reviewed.   Constitutional:       General: She is not in acute distress.     Appearance: She is not ill-appearing.   HENT:      Head: Normocephalic and atraumatic.      Right Ear: Tympanic membrane normal.      Left Ear:  "Tympanic membrane normal.      Nose: No congestion or rhinorrhea.      Mouth/Throat:      Pharynx: No oropharyngeal exudate or posterior oropharyngeal erythema.   Eyes:      Extraocular Movements: Extraocular movements intact.      Conjunctiva/sclera: Conjunctivae normal.      Pupils: Pupils are equal, round, and reactive to light.   Cardiovascular:      Rate and Rhythm: Normal rate and regular rhythm.      Heart sounds: No murmur heard.     No friction rub. No gallop.   Pulmonary:      Effort: Pulmonary effort is normal.      Breath sounds: Normal breath sounds. No wheezing, rhonchi or rales.   Abdominal:      General: There is no distension.      Palpations: Abdomen is soft.      Tenderness: There is no abdominal tenderness. There is no guarding or rebound.   Musculoskeletal:         General: No swelling or deformity.      Cervical back: Normal range of motion and neck supple.      Right lower leg: No edema.      Left lower leg: No edema.   Skin:     Capillary Refill: Capillary refill takes less than 2 seconds.      Coloration: Skin is not jaundiced.      Findings: Lesion present. No rash.   Neurological:      General: No focal deficit present.      Mental Status: She is alert.      Motor: Weakness present.   Psychiatric:         Mood and Affect: Mood normal.       Lab Results   Component Value Date    WBC 6.4 10/03/2023    HGB 13.9 10/03/2023    HCT 43.2 10/03/2023    MCV 94 10/03/2023     10/03/2023     No results found for: \"CHOL\"  No results found for: \"HDL\"  No results found for: \"LDLCALC\"  No results found for: \"TRIG\"  No components found for: \"CHOLHDL\"  Lab Results   Component Value Date    HGBA1C 5.4 09/14/2023       Assessment/Plan  Problem List Items Addressed This Visit       Recurrent seizures (CMS/HCC)    Urinary tract infection with hematuria     Other Visit Diagnoses       SDH (subdural hematoma) (CMS/[facility])                  Electronically Signed By: Wili Gomez MD   10/19/23  5:49 PM  "

## 2023-10-08 PROBLEM — M19.91 LOCALIZED, PRIMARY OSTEOARTHRITIS: Status: ACTIVE | Noted: 2017-11-28

## 2023-10-08 PROBLEM — M81.0 OSTEOPOROSIS: Status: ACTIVE | Noted: 2017-06-01

## 2023-10-08 PROBLEM — F32.A DEPRESSION: Status: ACTIVE | Noted: 2023-10-08

## 2023-10-08 PROBLEM — R41.2 RETROGRADE AMNESIA: Status: ACTIVE | Noted: 2017-12-29

## 2023-10-08 PROBLEM — F33.8 OTHER RECURRENT DEPRESSIVE DISORDERS (CMS-HCC): Status: ACTIVE | Noted: 2020-01-14

## 2023-10-08 PROBLEM — G47.33 OSA (OBSTRUCTIVE SLEEP APNEA): Status: ACTIVE | Noted: 2023-10-08

## 2023-10-08 PROBLEM — R31.29 MICROHEMATURIA: Status: ACTIVE | Noted: 2023-10-08

## 2023-10-08 PROBLEM — E44.0 MALNUTRITION OF MODERATE DEGREE (MULTI): Status: ACTIVE | Noted: 2023-05-03

## 2023-10-08 PROBLEM — K59.01 SLOW TRANSIT CONSTIPATION: Status: ACTIVE | Noted: 2022-11-07

## 2023-10-08 PROBLEM — R29.6 MULTIPLE FALLS: Status: ACTIVE | Noted: 2022-11-07

## 2023-10-08 PROBLEM — M20.019 MALLET FINGER: Status: ACTIVE | Noted: 2017-09-12

## 2023-10-08 PROBLEM — E78.2 MIXED HYPERLIPIDEMIA: Status: ACTIVE | Noted: 2020-02-26

## 2023-10-08 PROBLEM — E55.9 VITAMIN D DEFICIENCY: Status: ACTIVE | Noted: 2021-03-16

## 2023-10-08 PROBLEM — G40.909 RECURRENT SEIZURES (MULTI): Status: ACTIVE | Noted: 2020-01-14

## 2023-10-08 PROBLEM — R13.11 ORAL PHASE DYSPHAGIA: Status: ACTIVE | Noted: 2022-11-07

## 2023-10-08 RX ORDER — AMANTADINE HYDROCHLORIDE 100 MG/1
100 CAPSULE, GELATIN COATED ORAL
COMMUNITY
Start: 2010-10-19

## 2023-10-08 RX ORDER — DOCUSATE SODIUM 100 MG/1
100 CAPSULE, LIQUID FILLED ORAL 2 TIMES DAILY PRN
COMMUNITY
Start: 2010-10-19

## 2023-10-08 RX ORDER — DIVALPROEX SODIUM 125 MG/1
125 CAPSULE, COATED PELLETS ORAL 3 TIMES DAILY
COMMUNITY
Start: 2023-05-10

## 2023-10-08 RX ORDER — ALCLOMETASONE DIPROPIONATE 0.5 MG/G
CREAM TOPICAL
COMMUNITY

## 2023-10-08 RX ORDER — DIVALPROEX SODIUM 500 MG/1
500 TABLET, FILM COATED, EXTENDED RELEASE ORAL
COMMUNITY
Start: 2010-10-19

## 2023-10-08 RX ORDER — NAPROXEN SODIUM 220 MG
220 TABLET ORAL
COMMUNITY

## 2023-10-08 RX ORDER — LISINOPRIL 10 MG/1
10 TABLET ORAL
COMMUNITY
Start: 2010-10-19

## 2023-10-08 RX ORDER — TOLTERODINE 4 MG/1
4 CAPSULE, EXTENDED RELEASE ORAL
COMMUNITY
Start: 2022-11-07 | End: 2023-11-07

## 2023-10-08 RX ORDER — ASPIRIN 81 MG/1
81 TABLET ORAL 2 TIMES DAILY
COMMUNITY
Start: 2022-07-14

## 2023-10-09 ENCOUNTER — NURSING HOME VISIT (OUTPATIENT)
Dept: POST ACUTE CARE | Facility: EXTERNAL LOCATION | Age: 78
End: 2023-10-09
Payer: MEDICARE

## 2023-10-09 DIAGNOSIS — F41.9 ANXIETY: ICD-10-CM

## 2023-10-09 DIAGNOSIS — S06.5XAA SDH (SUBDURAL HEMATOMA) (MULTI): ICD-10-CM

## 2023-10-09 PROBLEM — R31.9 URINARY TRACT INFECTION WITH HEMATURIA: Status: ACTIVE | Noted: 2023-10-09

## 2023-10-09 PROBLEM — N39.0 URINARY TRACT INFECTION WITH HEMATURIA: Status: ACTIVE | Noted: 2023-10-09

## 2023-10-09 PROCEDURE — 99309 SBSQ NF CARE MODERATE MDM 30: CPT | Performed by: FAMILY MEDICINE

## 2023-10-09 NOTE — PROGRESS NOTES
Ashley Lopez is a 77 y.o. female with Chief Complaint of SNF ([facility]) H&P    Resident seen 10/5/23 -- LIZBETH    CC: SNF ([facility]) Re-admit H&P    : 1945  SNF H&P done 23, 10/5/23 (EPIC)  Allergy: Hydroxyzine, Bactrim  DNR-CCA    S: 76 yo woman with seizure disorder, HTN, HLD, MDD, Hx TIA/CVA with progressive vascular dementia, falls, CHI, right brow lac readmitted to SNF rehab after interval hospitalization for ICH on follow up imaging. Dx'd with toxic metabolic encephalopathy and UTI. No intervention needed per NS. Stopped haldol, ativan, and trazodone.    O: VSS AFEB 102# (23) Awake, alert, pleasantly confused. NAD. Chest cta. heart rrr. Ext no c/c/e. Right brow lac healing. Resolving right periorbital ecchymosis.    LAB (10/3/23) K 4, Cr 0.42, Na 137, Phos 4.3, Alb 3.9, WBC 6.4, Hgb 13.9    A/P:  # UTI: macrobid started in hospital.  # Weakness/falls: SNF PT/OT. Goal to return home under 24/7 supervision by family. Lovenox until ambulatory.  # ICH: worsening SDH, stable SAH, no intervention per NS.  # Anxiety/MDD: fluoxetine & buspirone. OFF ativan prn.  # OAB: off anticholinergics. Previously on tolteridine at home.  # Insomnia: Off Trazodone. Melatonin.  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 Off trazodone and ativan per hospital.  # Seizure disorder: stable on VPA. Thiamine x 2 weeks per neuro.  # HLD/Hx CVA: statin  # HTN: amlodipine 5 mg daily    Past Surgical History:   Procedure Laterality Date    JOINT REPLACEMENT        Social History     Socioeconomic History    Marital status:      Spouse name: Not on file    Number of children: Not on file    Years of education: Not on file    Highest education level: Not on file   Occupational History    Not on file   Tobacco Use    Smoking status: Former     Types: Cigarettes    Smokeless tobacco: Never   Substance and Sexual Activity    Alcohol use: Not on file    Drug use:  Not on file    Sexual activity: Not on file   Other Topics Concern    Not on file   Social History Narrative    Not on file     Social Determinants of Health     Financial Resource Strain: Not on file   Food Insecurity: Not on file   Transportation Needs: Not on file   Physical Activity: Not on file   Stress: Not on file   Social Connections: Not on file   Intimate Partner Violence: Not on file   Housing Stability: Not on file     Past Medical History:   Diagnosis Date    Arthritis     Hypertension     Skin cancer     Vascular dementia (CMS/HCC)       Family History   Problem Relation Name Age of Onset    No Known Problems Other          Review of Systems   Constitutional:  Negative for chills, fatigue and fever.   HENT:  Negative for rhinorrhea and sore throat.    Eyes:  Negative for pain and redness.   Respiratory:  Negative for cough and shortness of breath.    Cardiovascular:  Negative for chest pain and palpitations.   Gastrointestinal:  Negative for abdominal pain and blood in stool.   Endocrine: Negative for polydipsia and polyuria.   Genitourinary:  Negative for dysuria and hematuria.   Musculoskeletal:  Negative for back pain and neck stiffness.   Skin:  Positive for wound. Negative for rash.   Allergic/Immunologic: Negative for environmental allergies and food allergies.   Neurological:  Positive for weakness. Negative for headaches.   Hematological:  Negative for adenopathy. Does not bruise/bleed easily.   Psychiatric/Behavioral:  Positive for confusion. Negative for hallucinations and suicidal ideas.       LMP  (LMP Unknown) Comment: NO LONGER HAS A MENSTRUAL CYCLE  Physical Exam  Vitals reviewed.   Constitutional:       General: She is not in acute distress.     Appearance: She is not ill-appearing.   HENT:      Head: Normocephalic and atraumatic.      Right Ear: Tympanic membrane normal.      Left Ear: Tympanic membrane normal.      Nose: No congestion or rhinorrhea.      Mouth/Throat:      Pharynx: No  "oropharyngeal exudate or posterior oropharyngeal erythema.   Eyes:      Extraocular Movements: Extraocular movements intact.      Conjunctiva/sclera: Conjunctivae normal.      Pupils: Pupils are equal, round, and reactive to light.   Cardiovascular:      Rate and Rhythm: Normal rate and regular rhythm.      Heart sounds: No murmur heard.     No friction rub. No gallop.   Pulmonary:      Effort: Pulmonary effort is normal.      Breath sounds: Normal breath sounds. No wheezing, rhonchi or rales.   Abdominal:      General: There is no distension.      Palpations: Abdomen is soft.      Tenderness: There is no abdominal tenderness. There is no guarding or rebound.   Musculoskeletal:         General: No swelling or deformity.      Cervical back: Normal range of motion and neck supple.      Right lower leg: No edema.      Left lower leg: No edema.   Skin:     Capillary Refill: Capillary refill takes less than 2 seconds.      Coloration: Skin is not jaundiced.      Findings: Lesion present. No rash.   Neurological:      General: No focal deficit present.      Mental Status: She is alert.      Motor: Weakness present.   Psychiatric:         Mood and Affect: Mood normal.       Lab Results   Component Value Date    WBC 6.4 10/03/2023    HGB 13.9 10/03/2023    HCT 43.2 10/03/2023    MCV 94 10/03/2023     10/03/2023     No results found for: \"CHOL\"  No results found for: \"HDL\"  No results found for: \"LDLCALC\"  No results found for: \"TRIG\"  No components found for: \"CHOLHDL\"  Lab Results   Component Value Date    HGBA1C 5.4 09/14/2023       Assessment/Plan   Problem List Items Addressed This Visit       Recurrent seizures (CMS/HCC)    Urinary tract infection with hematuria     Other Visit Diagnoses       SDH (subdural hematoma) (CMS/[facility])                "

## 2023-10-09 NOTE — LETTER
Patient: Ashley Lopez  : 1945    Encounter Date: 10/09/2023    Resident seen 10/9/23 -- MP    CC: SNF ([facility]) Re-check    : 1945  SNF H&P done 23, 10/5/23 (EPIC)  Allergy: Hydroxyzine, Bactrim  DNR-CCA    S: 78 yo woman with seizure disorder, HTN, HLD, MDD, Hx TIA/CVA with progressive vascular dementia, frequent falls and stable ICH -- sleeping better with ativan over weekend.    O: VSS AFEB 101# (down 1#) Awake, alert, pleasantly confused. NAD. Chest cta. heart rrr. Ext no c/c/e. Right brow lac healing. Resolving right periorbital ecchymosis.    LAB (10/6/23) K 3.9, Cr 0.52, Na 136, Phos 4.3, Alb 3.9->3.6, WBC 6.4, Hgb 13.9->12.8    A/P:  # Hx UTI: Completed macrobid.  # Weakness/falls: SNF PT/OT. Goal to return home under 24/7 supervision by family. OFF Lovenox.  # ICH: worsening SDH, stable SAH, no intervention per NS.  # Anxiety/MDD: fluoxetine & buspirone. Tolerating restart ativan prn over weekend.  # OAB: off anticholinergics. Previously on tolteridine at home.  # Insomnia: Off Trazodone. Melatonin.  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 Off trazodone and ativan per hospital.  # Seizure disorder: stable on VPA. Thiamine x 2 weeks per neuro.  # HLD/Hx CVA: statin  # HTN: amlodipine 5 mg daily      Electronically Signed By: Wili Gomez MD   10/19/23  5:50 PM

## 2023-10-10 LAB
BACTERIA UR CULT: ABNORMAL
BACTERIA UR CULT: ABNORMAL

## 2023-10-12 ENCOUNTER — NURSING HOME VISIT (OUTPATIENT)
Dept: POST ACUTE CARE | Facility: EXTERNAL LOCATION | Age: 78
End: 2023-10-12
Payer: MEDICARE

## 2023-10-12 DIAGNOSIS — E78.5 DYSLIPIDEMIA: ICD-10-CM

## 2023-10-12 DIAGNOSIS — F32.A DEPRESSION, UNSPECIFIED DEPRESSION TYPE: ICD-10-CM

## 2023-10-12 DIAGNOSIS — K59.00 CONSTIPATION, UNSPECIFIED CONSTIPATION TYPE: ICD-10-CM

## 2023-10-12 DIAGNOSIS — I10 ESSENTIAL HYPERTENSION: ICD-10-CM

## 2023-10-12 DIAGNOSIS — E56.9 VITAMIN DEFICIENCY: ICD-10-CM

## 2023-10-12 DIAGNOSIS — S06.5XAA SDH (SUBDURAL HEMATOMA) (MULTI): Primary | ICD-10-CM

## 2023-10-12 PROCEDURE — 99310 SBSQ NF CARE HIGH MDM 45: CPT | Performed by: NURSE PRACTITIONER

## 2023-10-12 NOTE — CONSULTS
Service:   Service:  Service: Surgery     Consult:  Consult requested by (Attending Name): Cover   Reason: SDH     History of Present Illness:   History Present Illness:  HPI:    77yF h/o CVA (on ASA), vascular dementia, epilepsy, HTN, HLD, p/w multiple falls, last on 9/11, CTH at that time w/ R acute subdural hematoma, 9/12 CTH stable, 9/29  p/w worsening mental status, CTH stable R chronic subdural hematoma     Patient Ox1 at baseline, on prophylactic LVX for DVT prophylaxis. Patient unable to provide history, denies pain.    The patient's radiographic findings were personally reviewed and the following significant findings were identified: CTH stable R chronic subdural hematoma w/ layering without edema,  with mass effect    PMHx:  as above    PSHx:  as above    SHx:  denies smoking, illicits, EtOH    FHx:  reviewed and not pertinent to the current admission    14 point ROS negative except as above    Some components of the patient's HPI were obtained through personal review of the patient's available medical records.             Allergies:  ·  Bactrim : Blistering Dis.  ·  hydroxyzine  derivatives: Unknown    Objective:   Physical Exam by System:    Constitutional: Laying in bed, no acute distress   Head/Neck: atraumatic   Respiratory/Thorax: breathing comfortably   Cardiovascular: well perfused   Gastrointestinal: nondistended   Musculoskeletal: full range of motion   Neurological: Ox1  follows commands x4 5/5   Psychological: confused   Skin: bruising over arms       Assessment/Recommendations:  Assessment:    77yF h/o CVA (on ASA), vascular dementia, epilepsy, HTN, HLD, p/w multiple falls, last on 9/11, CTH at that time w/ R acute subdural hematoma, 9/12 CTH stable, 9/29  p/w worsening mental status, CTH stable R chronic subdural hematoma    Patient with stable appearing subdural hematoma that has chronified since previous CTH. Mental status Ox1 appears to be stable to reported baseline at this  time.    Recommendations    Recommend repeat CTH without contrast  recommend keppra 500BID, consider seizure work up with EEG if mental status continues to wax and wane  hold ASA  hold DVT prophylaxis       Consult Status:  Consult Status    (select all that apply): initial  consult complete, will follow     Attestation:   Note Completion:  I am a:  Resident/Fellow   Attending Attestation I saw and evaluated the patient.  I personally obtained the key and critical portions of the history and physical exam or was physically present for key and  critical portions performed by the resident/fellow. I reviewed the resident/fellow?s documentation and discussed the patient with the resident/fellow.  I agree with the resident/fellow?s medical decision making as documented in the note.     I personally evaluated the patient on 29-Sep-2023         Electronic Signatures:  Lashonda Loya)  (Signed 29-Sep-2023 18:13)   Authored: Assessment/Recommendations, Note Completion   Co-Signer: History of Present Illness, Objective, Assessment/Recommendations, Note Completion  Baldo Zaidi (Resident))  (Signed 29-Sep-2023 16:04)   Authored: Service, History of Present Illness, Allergies,  Objective, Assessment/Recommendations, Note Completion      Last Updated: 29-Sep-2023 18:13 by Lashonda Loya)

## 2023-10-16 ENCOUNTER — NURSING HOME VISIT (OUTPATIENT)
Dept: POST ACUTE CARE | Facility: EXTERNAL LOCATION | Age: 78
End: 2023-10-16
Payer: MEDICARE

## 2023-10-16 DIAGNOSIS — I10 ESSENTIAL HYPERTENSION: ICD-10-CM

## 2023-10-16 DIAGNOSIS — F01.B11 MODERATE VASCULAR DEMENTIA WITH AGITATION (MULTI): ICD-10-CM

## 2023-10-16 PROCEDURE — 99309 SBSQ NF CARE MODERATE MDM 30: CPT | Performed by: FAMILY MEDICINE

## 2023-10-16 NOTE — LETTER
Patient: Ashley Lopez  : 1945    Encounter Date: 10/16/2023    Resident seen 10/16/23 -- MP    CC: SNF ([facility]) Re-check    : 1945  SNF H&P done 23, 10/5/23 (EPIC)  Allergy: Hydroxyzine, Bactrim  DNR-CCA    S: 78 yo woman with seizure disorder, HTN, HLD, MDD, Hx TIA/CVA with progressive vascular dementia, frequent falls and stable ICH -- sleeping better with ativan and increased seroquel.    O: VSS AFEB 101# (10/7/23) Awake, alert, pleasantly confused. NAD. Chest cta. heart rrr. Ext no c/c/e. Right brow lac healed. Resolving right periorbital ecchymosis.    LAB (10/6/23) K 3.9, Cr 0.52, Na 136, Phos 4.3, Alb 3.9->3.6, WBC 6.4, Hgb 13.9->12.8    A/P:  # Weakness/falls: SNF PT/OT. Goal to return home under 24/7 supervision by family. OFF Lovenox.  # ICH: worsening SDH, stable SAH, no intervention per NS.  # Anxiety/MDD: fluoxetine & buspirone. Tolerating restart ativan prn over weekend.  # OAB: off anticholinergics. Previously on tolteridine at home.  # Insomnia: Off Trazodone. Melatonin.  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 Tolerating ativan prn. Consider [facility] to help with transition back home.  # Seizure disorder: stable on VPA.  # HLD/Hx CVA: statin  # HTN: running low -- DC amlodipine 5 mg daily  # Hx UTI: Completed macrobid.      Electronically Signed By: Wili Gomez MD   10/19/23  5:51 PM

## 2023-10-16 NOTE — PROGRESS NOTES
*Provider Impression*    Patient is a 77 year old female who is seen today for management of multiple medical problems     #SDH - PT/OT, soft helmet, low bed, acetaminophen 975mg q8h PRN  #HTN / HLD - amlodipine 5mg daily, atorvastatin 20mg QHS  #Constipation - miralax 17grams daily PRN, senna-S 8.6/50mg BID  #Vitamin deficiency - thiamine 100mg daily  #Depression - mgmt per psych - fluoxetine, depakote, lorazepam, buspar, seroquel, melatonin  #ACP - DNRCC-A    Follow up as needed      *Chief Complaint*     SDH    *History of Present Illness*    Patient is a 76 y/o female w/ PMH as below who presented to [facility] ED as an inter facility transfer from [facility] s/p presumed fall on 9/11.  reported he laid pt down in bed w/ side rails up and went to take the dogs out for a walk when he heard a loud thud on his way out and found the patient on the ground, states that she appeared somewhat confused however was awake.  called EMS and Pt was immobilized and transported to [facility]. AT OSH imaging consisted of CTH, and C-spine, Pt. also sustained a head lac the was primarily repaired in ED. Imaging revealed an acute SDH which prompted transfer to [facility] for trauma and Neurosurgical evaluation. NSGY recommended a follow up CTh which showed a worsening of the SDH and was transferred to the TICU for q1h neuro checks, and close monitoring. On 9/12 geriatrics was consulted for recurrent falls and made several recommendations regarding medication, dementia, and delirium management. On 9/13 pt was recommended moderate intensity SNF by therapy team. On 9/14 pt was transferred to [facility] and remained stable on 9/15-17. She was d/c on 9/18 to [facility] for rehab.     She was sent to the ED by neurology on 9/29 with worsening mental status and new SAH on imaging. Upon arrival CTH showed stable resolving right subacute on chronic SDH, repeat CTH stable, spot EEG in ED negative. She was seen by neurosurgery and no further  neurosurgical care recommended. Then d/c back to [facility].     She is seen sitting up in her room with her . She denies any pain, no HA, dizziness, no vision change, n/v, constiation, diarrhea, CP, SOB,  reports her condition waxes and wanes but overall is improved.    Alleriges - hydroxyzine, bactrim  PMH - vascular dementia, epilepsy, hypertension, hyperlipidemia, prior CVA   PSH - unreliable  FH - unreliable  SocHx - Non smoker, No EtOH    *Review of Systems*  All other systems reviewed are negative except as noted in the HPI     *Vital Signs*   Date: 10/11/23  - T: 98.0  P: 74  R: 16  BP: 127/76  SpO2: 95% on RA     *Results / Data*  CBC - Date: 10/6/23  WBC: 5.03  HGB: 12.8  HCT: 39.6  PLT: 193  ;   BMP - Date: 10/6/23  Na: 136  K: 3.9  Cl: 98  CO2: 27  BUN: 17  Cr: 0.52  Glu: 107  Ca: 9.4  ;   LFT - Date: 10/6/23  AST: 23  ALT: 30  ALP: 118  TBili: 0.4  ;   Coags - Date:   INR:   PT:    *Physical Exam*  Gen: (+) NAD, (+) well-appearing  HEENT: (+) normocephalic, (+) MMM, (+) EOMI, PERRL  Neck: (+) supple  Lungs: (+) CTAB, (-) wheezes, (-) rales, (-) rhonchi  Heart: (+) RRR, (+) S1 S2, (-) murmurs  Pulses: (+) palpable  Abd: (+) soft, (+) NT, (+) ND, (+) BS+  Ext: (-) edema, (-) deformity  MSK: (-) joint swelling  Skin: (+) warm, (+) dry, (-) rash  Neuro: (+) follows commands, (-) tremor, (+) alert

## 2023-10-19 ENCOUNTER — NURSING HOME VISIT (OUTPATIENT)
Dept: POST ACUTE CARE | Facility: EXTERNAL LOCATION | Age: 78
End: 2023-10-19
Payer: MEDICARE

## 2023-10-19 DIAGNOSIS — I10 ESSENTIAL HYPERTENSION: ICD-10-CM

## 2023-10-19 DIAGNOSIS — K59.00 CONSTIPATION, UNSPECIFIED CONSTIPATION TYPE: ICD-10-CM

## 2023-10-19 DIAGNOSIS — S06.5XAA SDH (SUBDURAL HEMATOMA) (MULTI): Primary | ICD-10-CM

## 2023-10-19 DIAGNOSIS — E56.9 VITAMIN DEFICIENCY: ICD-10-CM

## 2023-10-19 DIAGNOSIS — H04.129 DRY EYE: ICD-10-CM

## 2023-10-19 DIAGNOSIS — E78.5 DYSLIPIDEMIA: ICD-10-CM

## 2023-10-19 DIAGNOSIS — F32.A DEPRESSION, UNSPECIFIED DEPRESSION TYPE: ICD-10-CM

## 2023-10-19 PROBLEM — F41.9 ANXIETY: Status: ACTIVE | Noted: 2023-10-19

## 2023-10-19 PROCEDURE — 99308 SBSQ NF CARE LOW MDM 20: CPT | Performed by: NURSE PRACTITIONER

## 2023-10-19 ASSESSMENT — ENCOUNTER SYMPTOMS
NECK STIFFNESS: 0
WOUND: 1
EYE PAIN: 0
HEADACHES: 0
FEVER: 0
PALPITATIONS: 0
ABDOMINAL PAIN: 0
COUGH: 0
ADENOPATHY: 0
BRUISES/BLEEDS EASILY: 0
HALLUCINATIONS: 0
FATIGUE: 0
WEAKNESS: 1
HEMATURIA: 0
BACK PAIN: 0
RHINORRHEA: 0
CHILLS: 0
DYSURIA: 0
SHORTNESS OF BREATH: 0
POLYDIPSIA: 0
SORE THROAT: 0
BLOOD IN STOOL: 0
CONFUSION: 1
EYE REDNESS: 0

## 2023-10-19 NOTE — PROGRESS NOTES
Resident seen 10/9/23 -- MP    CC: SNF ([facility]) Re-check    : 1945  SNF H&P done 23, 10/5/23 (EPIC)  Allergy: Hydroxyzine, Bactrim  DNR-CCA    S: 76 yo woman with seizure disorder, HTN, HLD, MDD, Hx TIA/CVA with progressive vascular dementia, frequent falls and stable ICH -- sleeping better with ativan over weekend.    O: VSS AFEB 101# (down 1#) Awake, alert, pleasantly confused. NAD. Chest cta. heart rrr. Ext no c/c/e. Right brow lac healing. Resolving right periorbital ecchymosis.    LAB (10/6/23) K 3.9, Cr 0.52, Na 136, Phos 4.3, Alb 3.9->3.6, WBC 6.4, Hgb 13.9->12.8    A/P:  # Hx UTI: Completed macrobid.  # Weakness/falls: SNF PT/OT. Goal to return home under 24/ supervision by family. OFF Lovenox.  # ICH: worsening SDH, stable SAH, no intervention per NS.  # Anxiety/MDD: fluoxetine & buspirone. Tolerating restart ativan prn over weekend.  # OAB: off anticholinergics. Previously on tolteridine at home.  # Insomnia: Off Trazodone. Melatonin.  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 Off trazodone and ativan per hospital.  # Seizure disorder: stable on VPA. Thiamine x 2 weeks per neuro.  # HLD/Hx CVA: statin  # HTN: amlodipine 5 mg daily

## 2023-10-19 NOTE — PROGRESS NOTES
Resident seen 10/16/23 -- MP    CC: SNF ([facility]) Re-check    : 1945  SNF H&P done 23, 10/5/23 (EPIC)  Allergy: Hydroxyzine, Bactrim  DNR-CCA    S: 78 yo woman with seizure disorder, HTN, HLD, MDD, Hx TIA/CVA with progressive vascular dementia, frequent falls and stable ICH -- sleeping better with ativan and increased seroquel.    O: VSS AFEB 101# (10/7/23) Awake, alert, pleasantly confused. NAD. Chest cta. heart rrr. Ext no c/c/e. Right brow lac healed. Resolving right periorbital ecchymosis.    LAB (10/6/23) K 3.9, Cr 0.52, Na 136, Phos 4.3, Alb 3.9->3.6, WBC 6.4, Hgb 13.9->12.8    A/P:  # Weakness/falls: SNF PT/OT. Goal to return home under  supervision by family. OFF Lovenox.  # ICH: worsening SDH, stable SAH, no intervention per NS.  # Anxiety/MDD: fluoxetine & buspirone. Tolerating restart ativan prn over weekend.  # OAB: off anticholinergics. Previously on tolteridine at home.  # Insomnia: Off Trazodone. Melatonin.  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 Tolerating ativan prn. Consider [facility] to help with transition back home.  # Seizure disorder: stable on VPA.  # HLD/Hx CVA: statin  # HTN: running low -- DC amlodipine 5 mg daily  # Hx UTI: Completed macrobid.

## 2023-10-21 NOTE — PROGRESS NOTES
*Provider Impression*    Patient is a 77 year old female who is seen today for management of multiple medical problems     #SDH - PT/OT, soft helmet, low bed, acetaminophen 975mg q8h PRN  #HTN / HLD - amlodipine 5mg daily, atorvastatin 20mg QHS  #Constipation - miralax 17grams daily PRN, senna-S 8.6/50mg BID  #Vitamin deficiency - thiamine 100mg daily  #Depression - mgmt per psych - fluoxetine, depakote, lorazepam, buspar, seroquel, melatonin  #Dry eye - artificial tears 4x/day PRN  #ACP - DNRCC-A    Follow up as needed      *Chief Complaint*     SDH    *History of Present Illness*    Patient is a 78 y/o female w/ PMH as below who presented to [facility] ED as an inter facility transfer from [facility] s/p presumed fall on 9/11.  reported he laid pt down in bed w/ side rails up and went to take the dogs out for a walk when he heard a loud thud on his way out and found the patient on the ground, states that she appeared somewhat confused however was awake.  called EMS and Pt was immobilized and transported to [facility]. AT OSH imaging consisted of CTH, and C-spine, Pt. also sustained a head lac the was primarily repaired in ED. Imaging revealed an acute SDH which prompted transfer to [facility] for trauma and Neurosurgical evaluation. NSGY recommended a follow up CTh which showed a worsening of the SDH and was transferred to the TICU for q1h neuro checks, and close monitoring. On 9/12 geriatrics was consulted for recurrent falls and made several recommendations regarding medication, dementia, and delirium management. On 9/13 pt was recommended moderate intensity SNF by therapy team. On 9/14 pt was transferred to [facility] and remained stable on 9/15-17. She was d/c on 9/18 to [facility] for rehab.     She was sent to the ED by neurology on 9/29 with worsening mental status and new SAH on imaging. Upon arrival CTH showed stable resolving right subacute on chronic SDH, repeat CTH stable, spot EEG in ED negative. She was  seen by neurosurgery and no further neurosurgical care recommended. Then d/c back to [facility].     She was started on eye drops for dry eye.    She is seen today working w/ therapy. She denies HA, vision changes, dizziness, CP, SOB, n/v, abd pain, constipation, diarrhea, or any other new c/o presently.     Alleriges - hydroxyzine, bactrim  PMH - vascular dementia, epilepsy, hypertension, hyperlipidemia, prior CVA   PSH - unreliable  FH - unreliable  SocHx - Non smoker, No EtOH    *Review of Systems*  All other systems reviewed are negative except as noted in the HPI     *Vital Signs*   Date: 10/19/23  - T: 97.9  P: 76  R: 16  BP: 156/83  SpO2: 95% on RA     *Results / Data*  CBC - Date: 10/6/23  WBC: 5.03  HGB: 12.8  HCT: 39.6  PLT: 193  ;   BMP - Date: 10/6/23  Na: 136  K: 3.9  Cl: 98  CO2: 27  BUN: 17  Cr: 0.52  Glu: 107  Ca: 9.4  ;   LFT - Date: 10/6/23  AST: 23  ALT: 30  ALP: 118  TBili: 0.4  ;   Coags - Date:   INR:   PT:    *Physical Exam*  Gen: (+) NAD, (+) well-appearing  HEENT: (+) normocephalic, (+) MMM, (+) EOMI, PERRL  Neck: (+) supple  Lungs: (+) CTAB, (-) wheezes, (-) rales, (-) rhonchi  Heart: (+) RRR, (+) S1 S2, (-) murmurs  Pulses: (+) palpable  Abd: (+) soft, (+) NT, (+) ND, (+) BS+  Ext: (-) edema, (-) deformity  MSK: (-) joint swelling  Skin: (+) warm, (+) dry, (-) rash  Neuro: (+) follows commands, (-) tremor, (+) alert

## 2023-10-23 ENCOUNTER — NURSING HOME VISIT (OUTPATIENT)
Dept: POST ACUTE CARE | Facility: EXTERNAL LOCATION | Age: 78
End: 2023-10-23
Payer: MEDICARE

## 2023-10-23 DIAGNOSIS — S06.5XAA SDH (SUBDURAL HEMATOMA) (MULTI): ICD-10-CM

## 2023-10-23 DIAGNOSIS — I10 ESSENTIAL HYPERTENSION: ICD-10-CM

## 2023-10-23 PROCEDURE — 99308 SBSQ NF CARE LOW MDM 20: CPT | Performed by: FAMILY MEDICINE

## 2023-10-23 NOTE — LETTER
Patient: Ashley Lopez  : 1945    Encounter Date: 10/23/2023    Resident seen 10/23/23 -- MP    CC: SNF ([facility]) Re-check    : 1945  SNF H&P done 23, 10/5/23 (EPIC)  Allergy: Hydroxyzine, Bactrim  DNR-CCA    S: 78 yo woman with seizure disorder, HTN, HLD, MDD, Hx TIA/CVA with progressive vascular dementia, frequent falls and stable ICH -- sleeping better with ativan and increased seroquel.    O: VSS AFEB 101# (10/7/23) Awake, alert, pleasantly confused. NAD. Chest cta. heart rrr. Ext no c/c/e. Resolving right periorbital ecchymosis.    LAB (10/6/23) K 3.9, Cr 0.52, Na 136, Phos 4.3, Alb 3.9->3.6, WBC 6.4, Hgb 13.9->12.8    A/P:  # Weakness/falls: continue SNF PT/OT. Goal to return home under  supervision by family. OFF Lovenox.  # ICH: worsening SDH, stable SAH, no intervention per NS.  # Anxiety/MDD: fluoxetine & buspirone. Tolerating restart ativan prn over weekend.  # OAB: off anticholinergics. Previously on tolteridine at home.  # Insomnia: Off Trazodone. Melatonin.  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 Tolerating ativan prn. Consider [facility] to help with transition back home.  # Seizure disorder: stable on VPA.  # HLD/Hx CVA: statin  # HTN: running slightly high since off amlodipine 5 mg daily. Continue to monitor.  # Hx UTI: Completed macrobid.      Electronically Signed By: Wili Gomez MD   10/29/23  2:38 PM

## 2023-10-24 NOTE — PROGRESS NOTES
Resident seen 10/23/23 -- MP    CC: SNF ([facility]) Re-check    : 1945  SNF H&P done 23, 10/5/23 (EPIC)  Allergy: Hydroxyzine, Bactrim  DNR-CCA    S: 78 yo woman with seizure disorder, HTN, HLD, MDD, Hx TIA/CVA with progressive vascular dementia, frequent falls and stable ICH -- sleeping better with ativan and increased seroquel.    O: VSS AFEB 101# (10/7/23) Awake, alert, pleasantly confused. NAD. Chest cta. heart rrr. Ext no c/c/e. Resolving right periorbital ecchymosis.    LAB (10/6/23) K 3.9, Cr 0.52, Na 136, Phos 4.3, Alb 3.9->3.6, WBC 6.4, Hgb 13.9->12.8    A/P:  # Weakness/falls: continue SNF PT/OT. Goal to return home under 24/7 supervision by family. OFF Lovenox.  # ICH: worsening SDH, stable SAH, no intervention per NS.  # Anxiety/MDD: fluoxetine & buspirone. Tolerating restart ativan prn over weekend.  # OAB: off anticholinergics. Previously on tolteridine at home.  # Insomnia: Off Trazodone. Melatonin.  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 Tolerating ativan prn. Consider [facility] to help with transition back home.  # Seizure disorder: stable on VPA.  # HLD/Hx CVA: statin  # HTN: running slightly high since off amlodipine 5 mg daily. Continue to monitor.  # Hx UTI: Completed macrobid.

## 2023-10-27 ENCOUNTER — TELEPHONE (OUTPATIENT)
Dept: PRIMARY CARE | Facility: CLINIC | Age: 78
End: 2023-10-27
Payer: MEDICARE

## 2023-10-27 NOTE — TELEPHONE ENCOUNTER
Amelia from [facility] [phone]    Patient discharge from [place]-they are doing her HHC-her PCP is no longer in the area. Wants to know if Dr. Gomez will sign the [facility] orders until she finds another PCP?

## 2023-11-29 DIAGNOSIS — F01.B11 MODERATE VASCULAR DEMENTIA WITH AGITATION (MULTI): ICD-10-CM

## 2023-11-29 DIAGNOSIS — F01.B11 MODERATE VASCULAR DEMENTIA WITH AGITATION (MULTI): Primary | ICD-10-CM

## 2023-11-29 RX ORDER — BUSPIRONE HYDROCHLORIDE 5 MG/1
5 TABLET ORAL 3 TIMES DAILY
Qty: 90 TABLET | Refills: 0 | Status: SHIPPED | OUTPATIENT
Start: 2023-11-29 | End: 2023-12-05 | Stop reason: SDUPTHER

## 2023-11-29 RX ORDER — BUSPIRONE HYDROCHLORIDE 5 MG/1
5 TABLET ORAL 3 TIMES DAILY
Qty: 90 TABLET | Refills: 0 | Status: SHIPPED | OUTPATIENT
Start: 2023-11-29 | End: 2023-11-29 | Stop reason: SDUPTHER

## 2023-11-29 RX ORDER — QUETIAPINE FUMARATE 25 MG/1
62.5 TABLET, FILM COATED ORAL NIGHTLY
Qty: 75 TABLET | Refills: 0 | Status: SHIPPED | OUTPATIENT
Start: 2023-11-29 | End: 2023-12-05 | Stop reason: SDUPTHER

## 2023-11-29 RX ORDER — QUETIAPINE FUMARATE 25 MG/1
62.5 TABLET, FILM COATED ORAL NIGHTLY
Qty: 75 TABLET | Refills: 0 | Status: SHIPPED | OUTPATIENT
Start: 2023-11-29 | End: 2023-11-29 | Stop reason: SDUPTHER

## 2023-11-29 NOTE — TELEPHONE ENCOUNTER
Pt seen at [place]. Was seeing Bella Hardin-who left the clinic. Now has new patient appointment but not 12/18/23 with Dr. Mcdonald at the clinic. Requesting refill of    Quetiapine-. States takes 2.5 pills daily.  Buspirone 5mf, 3 times daily.     Can we give them a 30 day supply.

## 2023-12-05 DIAGNOSIS — F01.B11 MODERATE VASCULAR DEMENTIA WITH AGITATION (MULTI): ICD-10-CM

## 2023-12-05 RX ORDER — BUSPIRONE HYDROCHLORIDE 5 MG/1
5 TABLET ORAL 3 TIMES DAILY
Qty: 90 TABLET | Refills: 0 | Status: SHIPPED | OUTPATIENT
Start: 2023-12-05 | End: 2024-01-04

## 2023-12-05 RX ORDER — VALPROIC ACID 250 MG/5ML
250 SOLUTION ORAL 3 TIMES DAILY
Qty: 450 ML | Refills: 0 | Status: SHIPPED | OUTPATIENT
Start: 2023-12-05 | End: 2024-12-04

## 2023-12-05 RX ORDER — QUETIAPINE FUMARATE 25 MG/1
62.5 TABLET, FILM COATED ORAL NIGHTLY
Qty: 75 TABLET | Refills: 0 | Status: SHIPPED | OUTPATIENT
Start: 2023-12-05

## 2024-03-06 NOTE — CONSULTS
"      Service:   Service:  Service: General     Consult:  Consult requested by (Attending Name): ED provider   Reason: AMS     History of Present Illness:   History Present Illness:  HPI:    Ashley Lopez is a 77-year-old woman with a history of  vascular dementia, epilepsy, hypertension, hyperlipidemia, and prior stroke  who presented to [facility] ED due to new abnormal finding on follow up CT scan. Neurology was consulted due to AMS and concerned of seizure.  She was recently admitted due to SDH after a fall and was discharged to SNF since 9/18/23. Patient had follow-up CT head yesterday and showed new SAH. She was sent to [facility] for further evaluation. Neurosurgery was consulted, stable SAH on repeat CT head,  no intervention was recommended. Primary team concern of fluctuation of mental status, was AOx0 with them but AOx1 with neurosurgery team. EEG utility was discussed. Neurology was consulted.  Prior our evaluation, patient was agitated and was given haloperidol. On arrival, patient was sleeping and not following command, please see details in examination.  Further history was obtained from chart review,  and SNF. Patient has baseline dementia and daily sundowning at home. Per , he normally had to bring her outside the house, see her horses and then he would calm down (this is before a fall  and SDH). Per SNF, patient has been not oriented, agitated daily. She was able to talk but not making sense like \"what should I do?\" repetitively. Not clear that she was able to say her name, but responded when she was called. She has not been walking  since she was sent to SNF.  Chart review:  Her seizure was documented since 2008 after she had a fall with ICH, patient also had cognitive difficulties since then with residual right side weakness. Follow up with neurologist at [facility]. EEG in 2020, July 2022 due to a seizure concern  was negative.    Home meds:   acetaminophen 325 mg oral tablet 3 tab(s) orally " every 8 hours   amLODIPine 5 mg oral tablet 1 tab(s) orally once a day     atorvastatin 20 mg oral tablet 1 tab(s) orally once a day (in the morning)    Detrol LA 4 mg oral capsule, extended release 1 cap(s)  orally once a day (in the morning)    enoxaparin 30 milligram(s) subcutaneous every 12 hours     FLUoxetine 20 mg oral capsule 1 cap(s) orally once a day    haloperidol 1 mg oral tablet 1 tab(s) orally once (at bedtime), As needed,  Agitation    melatonin 3 mg oral tablet 1 tab(s) orally , As needed, Insomnia - Daily 1800     polyethylene glycol 3350 oral powder for reconstitution 17 gram(s) orally once a day, As needed, Constipation    QUEtiapine 25 mg oral  tablet 1 tab(s) orally  - (Every 1  day at ,16:00 )    sennosides-docusate 8.6 mg-50 mg oral tablet 2 tab(s) orally 2 times a day    valproic acid 250 mg/5 mL oral liquid 250  orally every 8 hours  ]  PMH: Hypertension, vascular dementia, epilepsy, hypertension, hyperlipidemia, stroke    Lives at [facility] living home: [phone]  SH: denies tobacco, alcohol and illicit drugs  FH: No hx bleeding; clotting disorders           Allergies:  ·  Bactrim : Blistering Dis.  ·  hydroxyzine  derivatives: Unknown    Objective:   Physical Exam Narrative:  ·  Physical Exam:    GENERAL:  No distress, curling in bed.    NEURO EXAM:  MENTAL STATE:   Eyes opened from repetitive light stimulation. Non verbal, not following commands.    OPHTHALMOSCOPIC:   Not cooperative.    CRANIAL NERVES:   CN 3, 4, 6    Pupils round, 4 mm in diameter, equally reactive to light. Lids symmetric; no ptosis. No nystagmus.   CN 5   Facial sensation intact bilaterally.   CN 7   Strong eyes closing. Nasolabial folds symmetric.   CN 8 - 12  Unable to assess as patient is not cooperating    MOTOR:   Muscle tone: Resisting tone checking  Movements: No fasciculations, tremors or other abnormal movement.    Strength:  Patient was not cooperative, but resisted the exam strongly all extremities,  RLE possibly weaker than LLE.    SENSORY:  Withdraw to light stim all extremities.    REFLEXES:   Biceps: R3L3  Triceps: R2L2  Brachioradialis: R2L2  Patellar: R3L3  Achilles: R2L2  Sustained clonus present R side  Plantar Response: R Up L down    COORDINATION: Patient was not cooperative    GAIT - Patient was not cooperative      Assessment/Recommendations:  Assessment:    Ashley Lopez is a 77-year-old woman with a history of  vascular dementia, ?seizure, hypertension, hyperlipidemia, and prior stroke  who presented to [facility] ED due to new abnormal finding on follow up CT scan. Neurology was consulted due to AMS and concerned of seizure.    Patient was sent to the ED due to new SAH on chronic SDH. Repeat CT head, stable Neurosurgery no acute intervention. Talked to  and nursing staff at nursing home, patient is a sun downer, agitated at night. Not oriented, unsure if patient was able  to say her name. She has been like this since she arrived at SNF after a fall this month. No shaking or starring episodes. Neurological examination was limited (post haloperidol).     The etiology of AMS is likely multifactorial, but mainly SDH on top of her baseline dementia. Per story, these has not been a seizure concern yet. Seizure is not high on differential diagnosis, but can not be excluded as she had a new brain lesion. With  above reasons, our recommendations as below.    Recommendation:  -Agree with EEG.  -Continue home med: valproic acid liquid 250 mg TID  -Thiamine 500 mg IV TID, switch to oral 100mg OD for 2 weeks if being discharge.  -UA  -The rest of care per primary team.    Will follow this case as a consult. Patient will be formally staffed with the attending in the morning.    Garcia Sanchez MD (Paul)  Neurology Resident, PGY3  Pager [phone]    Consult Status:  Consult Status    (select all that apply): initial  consult complete, will follow     Attestation:   Note Completion:  I am a:   Resident/Fellow   Attending Attestation I saw and evaluated the patient.  I personally obtained the key and critical portions of the history and physical exam or was physically present for key and  critical portions performed by the resident/fellow. I reviewed the resident/fellow?s documentation and discussed the patient with the resident/fellow.  I agree with the resident/fellow?s medical decision making as documented in the note.     I personally evaluated the patient on 29-Sep-2023       Consult Billing - Observation Patients:   Consult Billing Time:  ·  Total Time (minutes): 60        Electronic Signatures:  Shaikh, Aasef (MD)  (Signed 25-Oct-2023 15:36)   Authored: Assessment/Recommendations, Note Completion,  Consult Billing - Observation Patients   Co-Signer: Note Completion  Garcia Sanchez (Resident))  (Signed 29-Sep-2023 23:29)   Authored: Service, History of Present Illness, Allergies,  Objective, Assessment/Recommendations, Note Completion      Last Updated: 25-Oct-2023 15:36 by Shaikh, Aasef (MD)